# Patient Record
Sex: MALE | Race: NATIVE HAWAIIAN OR OTHER PACIFIC ISLANDER | Employment: OTHER | URBAN - METROPOLITAN AREA
[De-identification: names, ages, dates, MRNs, and addresses within clinical notes are randomized per-mention and may not be internally consistent; named-entity substitution may affect disease eponyms.]

---

## 2021-01-07 ENCOUNTER — OFFICE VISIT (OUTPATIENT)
Dept: FAMILY MEDICINE CLINIC | Facility: CLINIC | Age: 81
End: 2021-01-07
Payer: MEDICARE

## 2021-01-07 VITALS
HEART RATE: 83 BPM | BODY MASS INDEX: 25.33 KG/M2 | WEIGHT: 171 LBS | SYSTOLIC BLOOD PRESSURE: 120 MMHG | OXYGEN SATURATION: 98 % | HEIGHT: 69 IN | DIASTOLIC BLOOD PRESSURE: 68 MMHG

## 2021-01-07 DIAGNOSIS — J30.2 SEASONAL ALLERGIC RHINITIS, UNSPECIFIED TRIGGER: Primary | ICD-10-CM

## 2021-01-07 PROBLEM — E03.8 OTHER SPECIFIED HYPOTHYROIDISM: Status: ACTIVE | Noted: 2021-01-07

## 2021-01-07 PROBLEM — I10 ESSENTIAL HYPERTENSION: Status: ACTIVE | Noted: 2021-01-07

## 2021-01-07 PROBLEM — E78.49 OTHER HYPERLIPIDEMIA: Status: ACTIVE | Noted: 2021-01-07

## 2021-01-07 PROCEDURE — 99203 OFFICE O/P NEW LOW 30 MIN: CPT | Performed by: FAMILY MEDICINE

## 2021-01-07 RX ORDER — B-COMPLEX WITH VITAMIN C
1 TABLET ORAL DAILY
Qty: 90 TABLET | Refills: 1 | Status: SHIPPED | OUTPATIENT
Start: 2021-01-07

## 2021-01-07 RX ORDER — ATORVASTATIN CALCIUM 40 MG/1
40 TABLET, FILM COATED ORAL DAILY
Qty: 90 TABLET | Refills: 1 | Status: SHIPPED | OUTPATIENT
Start: 2021-01-07 | End: 2021-07-29

## 2021-01-07 RX ORDER — TAMSULOSIN HYDROCHLORIDE 0.4 MG/1
CAPSULE ORAL
COMMUNITY
Start: 2020-11-27 | End: 2021-01-07

## 2021-01-07 RX ORDER — LEVOTHYROXINE SODIUM 0.07 MG/1
75 TABLET ORAL
Qty: 90 TABLET | Refills: 1 | Status: SHIPPED | OUTPATIENT
Start: 2021-01-07 | End: 2021-07-29

## 2021-01-07 RX ORDER — LOSARTAN POTASSIUM 100 MG/1
TABLET ORAL
COMMUNITY
Start: 2020-10-15 | End: 2021-01-07

## 2021-01-07 RX ORDER — B-COMPLEX WITH VITAMIN C
TABLET ORAL
COMMUNITY
End: 2021-01-07

## 2021-01-07 RX ORDER — MELATONIN
1000 DAILY
Qty: 90 TABLET | Refills: 1 | Status: SHIPPED | OUTPATIENT
Start: 2021-01-07

## 2021-01-07 RX ORDER — ASPIRIN 81 MG/1
81 TABLET ORAL DAILY
COMMUNITY
End: 2021-01-07

## 2021-01-07 RX ORDER — FLUTICASONE PROPIONATE 50 MCG
2 SPRAY, SUSPENSION (ML) NASAL DAILY
Qty: 1 BOTTLE | Refills: 3 | Status: SHIPPED | OUTPATIENT
Start: 2021-01-07 | End: 2022-01-02

## 2021-01-07 RX ORDER — ATORVASTATIN CALCIUM 40 MG/1
TABLET, FILM COATED ORAL
COMMUNITY
Start: 2020-11-10 | End: 2021-01-07

## 2021-01-07 RX ORDER — ASPIRIN 81 MG/1
81 TABLET ORAL DAILY
Qty: 90 TABLET | Refills: 1 | Status: SHIPPED | OUTPATIENT
Start: 2021-01-07

## 2021-01-07 RX ORDER — RIBOFLAVIN (VITAMIN B2) 100 MG
100 TABLET ORAL DAILY
Qty: 90 TABLET | Refills: 1 | Status: SHIPPED | OUTPATIENT
Start: 2021-01-07

## 2021-01-07 RX ORDER — TAMSULOSIN HYDROCHLORIDE 0.4 MG/1
0.4 CAPSULE ORAL DAILY
Qty: 90 CAPSULE | Refills: 1 | Status: SHIPPED | OUTPATIENT
Start: 2021-01-07

## 2021-01-07 RX ORDER — LOSARTAN POTASSIUM 100 MG/1
100 TABLET ORAL DAILY
Qty: 90 TABLET | Refills: 1 | Status: SHIPPED | OUTPATIENT
Start: 2021-01-07 | End: 2021-07-29

## 2021-01-07 RX ORDER — MELATONIN
1000 DAILY
COMMUNITY
End: 2021-01-07

## 2021-01-07 RX ORDER — LEVOTHYROXINE SODIUM 0.07 MG/1
TABLET ORAL
COMMUNITY
Start: 2020-11-24 | End: 2021-01-07

## 2021-01-07 RX ORDER — RIBOFLAVIN (VITAMIN B2) 100 MG
100 TABLET ORAL DAILY
COMMUNITY
End: 2021-01-07

## 2021-01-07 NOTE — PROGRESS NOTES
HPI:    Patient ID: Tony Garcia is a 68year old male who presents for throat check. HPI  Would like his throat checked. Has runny nose in the morning. Stops on its own. Has some phlegm too.    Was given allergy medicine in the past, which seem ear discharge, ear pain, hearing loss, postnasal drip, sinus pressure, tinnitus, trouble swallowing and voice change. Eyes: Negative. Respiratory: Negative for cough, shortness of breath and wheezing. Cardiovascular: Negative.     Gastrointestinal: Prescriptions Disp Refills   • Fluticasone Propionate 50 MCG/ACT Nasal Suspension 1 Bottle 3     Si sprays by Each Nare route daily. • Ascorbic Acid (VITAMIN C) 100 MG Oral Tab 90 tablet 1     Sig: Take 1 tablet (100 mg total) by mouth daily.    • asp

## 2021-02-28 PROBLEM — G47.33 OSA (OBSTRUCTIVE SLEEP APNEA): Status: ACTIVE | Noted: 2021-02-28

## 2021-02-28 PROBLEM — I25.10 CORONARY ARTERY DISEASE INVOLVING NATIVE CORONARY ARTERY OF NATIVE HEART: Status: ACTIVE | Noted: 2021-02-28

## 2021-03-03 DIAGNOSIS — Z23 NEED FOR VACCINATION: ICD-10-CM

## 2021-07-19 ENCOUNTER — LAB ENCOUNTER (OUTPATIENT)
Dept: LAB | Facility: REFERENCE LAB | Age: 81
End: 2021-07-19
Attending: FAMILY MEDICINE
Payer: MEDICARE

## 2021-07-19 ENCOUNTER — OFFICE VISIT (OUTPATIENT)
Dept: FAMILY MEDICINE CLINIC | Facility: CLINIC | Age: 81
End: 2021-07-19
Payer: MEDICARE

## 2021-07-19 VITALS
OXYGEN SATURATION: 98 % | HEART RATE: 72 BPM | HEIGHT: 69 IN | BODY MASS INDEX: 25.62 KG/M2 | WEIGHT: 173 LBS | SYSTOLIC BLOOD PRESSURE: 110 MMHG | DIASTOLIC BLOOD PRESSURE: 60 MMHG

## 2021-07-19 DIAGNOSIS — H61.22 IMPACTED CERUMEN OF LEFT EAR: ICD-10-CM

## 2021-07-19 DIAGNOSIS — E78.49 OTHER HYPERLIPIDEMIA: ICD-10-CM

## 2021-07-19 DIAGNOSIS — I10 ESSENTIAL HYPERTENSION: ICD-10-CM

## 2021-07-19 DIAGNOSIS — R42 VERTIGO: ICD-10-CM

## 2021-07-19 DIAGNOSIS — Z00.00 ENCOUNTER FOR ANNUAL HEALTH EXAMINATION: Primary | ICD-10-CM

## 2021-07-19 DIAGNOSIS — N40.1 BENIGN PROSTATIC HYPERPLASIA WITH NOCTURIA: ICD-10-CM

## 2021-07-19 DIAGNOSIS — Z12.5 PROSTATE CANCER SCREENING: ICD-10-CM

## 2021-07-19 DIAGNOSIS — R35.1 BENIGN PROSTATIC HYPERPLASIA WITH NOCTURIA: ICD-10-CM

## 2021-07-19 DIAGNOSIS — E03.9 HYPOTHYROIDISM, UNSPECIFIED TYPE: ICD-10-CM

## 2021-07-19 DIAGNOSIS — I25.10 CORONARY ARTERY DISEASE INVOLVING NATIVE CORONARY ARTERY OF NATIVE HEART WITHOUT ANGINA PECTORIS: ICD-10-CM

## 2021-07-19 LAB
ALBUMIN SERPL-MCNC: 4.3 G/DL (ref 3.4–5)
ALBUMIN/GLOB SERPL: 1.2 {RATIO} (ref 1–2)
ALP LIVER SERPL-CCNC: 79 U/L
ALT SERPL-CCNC: 40 U/L
ANION GAP SERPL CALC-SCNC: 5 MMOL/L (ref 0–18)
AST SERPL-CCNC: 25 U/L (ref 15–37)
BILIRUB SERPL-MCNC: 1.5 MG/DL (ref 0.1–2)
BUN BLD-MCNC: 16 MG/DL (ref 7–18)
BUN/CREAT SERPL: 17.2 (ref 10–20)
CALCIUM BLD-MCNC: 9.6 MG/DL (ref 8.5–10.1)
CHLORIDE SERPL-SCNC: 106 MMOL/L (ref 98–112)
CHOLEST SMN-MCNC: 136 MG/DL (ref ?–200)
CO2 SERPL-SCNC: 28 MMOL/L (ref 21–32)
CREAT BLD-MCNC: 0.93 MG/DL
DEPRECATED RDW RBC AUTO: 43.6 FL (ref 35.1–46.3)
ERYTHROCYTE [DISTWIDTH] IN BLOOD BY AUTOMATED COUNT: 13.6 % (ref 11–15)
GLOBULIN PLAS-MCNC: 3.7 G/DL (ref 2.8–4.4)
GLUCOSE BLD-MCNC: 145 MG/DL (ref 70–99)
HCT VFR BLD AUTO: 44.8 %
HDLC SERPL-MCNC: 51 MG/DL (ref 40–59)
HGB BLD-MCNC: 14.7 G/DL
LDLC SERPL CALC-MCNC: 66 MG/DL (ref ?–100)
M PROTEIN MFR SERPL ELPH: 8 G/DL (ref 6.4–8.2)
MCH RBC QN AUTO: 28.6 PG (ref 26–34)
MCHC RBC AUTO-ENTMCNC: 32.8 G/DL (ref 31–37)
MCV RBC AUTO: 87.2 FL
NONHDLC SERPL-MCNC: 85 MG/DL (ref ?–130)
OSMOLALITY SERPL CALC.SUM OF ELEC: 292 MOSM/KG (ref 275–295)
PATIENT FASTING Y/N/NP: YES
PATIENT FASTING Y/N/NP: YES
PLATELET # BLD AUTO: 280 10(3)UL (ref 150–450)
POTASSIUM SERPL-SCNC: 4.2 MMOL/L (ref 3.5–5.1)
RBC # BLD AUTO: 5.14 X10(6)UL
SODIUM SERPL-SCNC: 139 MMOL/L (ref 136–145)
TRIGL SERPL-MCNC: 106 MG/DL (ref 30–149)
TSI SER-ACNC: 1.94 MIU/ML (ref 0.36–3.74)
VLDLC SERPL CALC-MCNC: 16 MG/DL (ref 0–30)
WBC # BLD AUTO: 7.4 X10(3) UL (ref 4–11)

## 2021-07-19 PROCEDURE — 69210 REMOVE IMPACTED EAR WAX UNI: CPT | Performed by: FAMILY MEDICINE

## 2021-07-19 PROCEDURE — 80061 LIPID PANEL: CPT | Performed by: FAMILY MEDICINE

## 2021-07-19 PROCEDURE — 85027 COMPLETE CBC AUTOMATED: CPT | Performed by: FAMILY MEDICINE

## 2021-07-19 PROCEDURE — 84443 ASSAY THYROID STIM HORMONE: CPT | Performed by: FAMILY MEDICINE

## 2021-07-19 PROCEDURE — 36415 COLL VENOUS BLD VENIPUNCTURE: CPT | Performed by: FAMILY MEDICINE

## 2021-07-19 PROCEDURE — 80053 COMPREHEN METABOLIC PANEL: CPT | Performed by: FAMILY MEDICINE

## 2021-07-19 PROCEDURE — G0439 PPPS, SUBSEQ VISIT: HCPCS | Performed by: FAMILY MEDICINE

## 2021-07-19 PROCEDURE — 99213 OFFICE O/P EST LOW 20 MIN: CPT | Performed by: FAMILY MEDICINE

## 2021-07-19 RX ORDER — MECLIZINE HCL 12.5 MG/1
12.5 TABLET ORAL 3 TIMES DAILY PRN
Qty: 30 TABLET | Refills: 0 | Status: SHIPPED | OUTPATIENT
Start: 2021-07-19

## 2021-07-19 NOTE — PROGRESS NOTES
HPI:   Genny Marrufo is a 80year old male who presents for a Medicare Subsequent Annual Wellness visit (Pt already had Initial Annual Wellness). Has had vertigo x10 days. Comes and goes. Started with wax build up in left ear.  Yesterday and today f Care on file in [facility] [address]. Not Discussed       He has never smoked tobacco.  Mr. Mayo Billings already takes aspirin and has it on his medication list.   CAGE screening score of 0 on 7/19/2021, showing low risk of alcohol abuse.          Patient Care Team: Patient C vision  HEENT: denies nasal congestion, sinus pain or ST  LUNGS: denies shortness of breath with exertion  CARDIOVASCULAR: denies chest pain on exertion  GI: denies abdominal pain, denies heartburn  : 2-3 per night nocturia, no complaint of urinary incon texture, turgor normal, no rashes or lesions   Lymph nodes: Cervical, supraclavicular, and axillary nodes normal   Neurologic: Normal              PROCEDURE NOTE:  Procedure: Cerumen removal  Risks and benefits were reviewed and verbal consent was obtained labs. To check today.   -Has urology f/u in 11/2021 with Dr. Linnea Egan record release form signed today. Need to obtain colonoscopy & vaccination (pneumococcal) records. -RTC in 6 months for check-up, or sooner prn.      The patient indicates un risk factors   • Men who are 73-68 years old and have ever smoked   • Anyone with a family history -     Colorectal Cancer Screening  Covered for ages 52-80; only need ONE of the following:    Colonoscopy   Covered every 10 years    Covered every 2 years i

## 2021-07-19 NOTE — PATIENT INSTRUCTIONS
Pro Nur's SCREENING SCHEDULE   Tests on this list are recommended by your physician but may not be covered, or covered at this frequency, by your insurer. Please check with your insurance carrier before scheduling to verify coverage.    PREVENT Pneumococcal Vaccination(1 of 1 - PPSV23) Never done    Hepatitis B One screening covered for patients with certain risk factors   -  No recommendations at this time    Tetanus Toxoid Not covered by Medicare Part B unless medically necessary (cut with

## 2021-07-29 RX ORDER — LOSARTAN POTASSIUM 100 MG/1
TABLET ORAL
Qty: 90 TABLET | Refills: 0 | Status: SHIPPED | OUTPATIENT
Start: 2021-07-29 | End: 2021-10-26

## 2021-07-29 RX ORDER — ATORVASTATIN CALCIUM 40 MG/1
TABLET, FILM COATED ORAL
Qty: 90 TABLET | Refills: 0 | Status: SHIPPED | OUTPATIENT
Start: 2021-07-29 | End: 2021-10-27

## 2021-07-29 RX ORDER — LEVOTHYROXINE SODIUM 0.07 MG/1
75 TABLET ORAL
Qty: 90 TABLET | Refills: 0 | Status: SHIPPED | OUTPATIENT
Start: 2021-07-29 | End: 2021-10-27

## 2021-08-19 ENCOUNTER — MED REC SCAN ONLY (OUTPATIENT)
Dept: FAMILY MEDICINE CLINIC | Facility: CLINIC | Age: 81
End: 2021-08-19

## 2021-10-26 RX ORDER — LOSARTAN POTASSIUM 100 MG/1
TABLET ORAL
Qty: 90 TABLET | Refills: 0 | Status: SHIPPED | OUTPATIENT
Start: 2021-10-26 | End: 2022-01-14

## 2021-10-27 RX ORDER — ATORVASTATIN CALCIUM 40 MG/1
40 TABLET, FILM COATED ORAL DAILY
Qty: 90 TABLET | Refills: 1 | Status: SHIPPED | OUTPATIENT
Start: 2021-10-27 | End: 2022-04-03

## 2021-10-27 RX ORDER — LEVOTHYROXINE SODIUM 0.07 MG/1
75 TABLET ORAL
Qty: 90 TABLET | Refills: 1 | Status: SHIPPED | OUTPATIENT
Start: 2021-10-27 | End: 2022-04-03

## 2021-11-22 ENCOUNTER — MED REC SCAN ONLY (OUTPATIENT)
Dept: FAMILY MEDICINE CLINIC | Facility: CLINIC | Age: 81
End: 2021-11-22

## 2021-12-23 ENCOUNTER — OFFICE VISIT (OUTPATIENT)
Dept: FAMILY MEDICINE CLINIC | Facility: CLINIC | Age: 81
End: 2021-12-23
Payer: MEDICARE

## 2021-12-23 VITALS
DIASTOLIC BLOOD PRESSURE: 88 MMHG | HEART RATE: 70 BPM | BODY MASS INDEX: 25 KG/M2 | SYSTOLIC BLOOD PRESSURE: 130 MMHG | WEIGHT: 172.63 LBS | OXYGEN SATURATION: 99 %

## 2021-12-23 DIAGNOSIS — I10 ESSENTIAL HYPERTENSION: Primary | ICD-10-CM

## 2021-12-23 DIAGNOSIS — R00.2 PALPITATIONS: ICD-10-CM

## 2021-12-23 DIAGNOSIS — I95.1 ORTHOSTATIC HYPOTENSION: ICD-10-CM

## 2021-12-23 PROCEDURE — 90662 IIV NO PRSV INCREASED AG IM: CPT | Performed by: FAMILY MEDICINE

## 2021-12-23 PROCEDURE — G0008 ADMIN INFLUENZA VIRUS VAC: HCPCS | Performed by: FAMILY MEDICINE

## 2021-12-23 PROCEDURE — 99214 OFFICE O/P EST MOD 30 MIN: CPT | Performed by: FAMILY MEDICINE

## 2021-12-23 NOTE — PROGRESS NOTES
HPI:    Patient ID: Israel Loza is a 80year old male who presents for check-up. HPI  Ended up seeing new cardiologist Dr. Ani Vlales with [facility]. Has been having palpitations since he was a child. No worsening.  Wore holter monitor x2 weeks but uns Bottle 3   • B Complex-C-E-Zn (BEC/ZINC) Oral Tab Take 1 tablet by mouth daily. (Patient not taking: Reported on 12/23/2021) 90 tablet 1   • tamsulosin (FLOMAX) cap Take 1 capsule (0.4 mg total) by mouth daily.  (Patient not taking: Reported on 12/23/2021) ASSESSMENT/PLAN:   Essential hypertension  (primary encounter diagnosis)  Palpitations  Orthostatic hypotension    1. Essential hypertension    2. Palpitations    3. Orthostatic hypotension     -Reviewed cardiology note from Dr. Cruz Flores at [facility].  Has f/u

## 2022-01-14 RX ORDER — LOSARTAN POTASSIUM 100 MG/1
TABLET ORAL
Qty: 90 TABLET | Refills: 0 | Status: SHIPPED | OUTPATIENT
Start: 2022-01-14

## 2022-04-03 RX ORDER — LEVOTHYROXINE SODIUM 0.07 MG/1
75 TABLET ORAL
Qty: 90 TABLET | Refills: 1 | Status: SHIPPED | OUTPATIENT
Start: 2022-04-03 | End: 2022-11-04

## 2022-04-03 RX ORDER — ATORVASTATIN CALCIUM 40 MG/1
40 TABLET, FILM COATED ORAL DAILY
Qty: 90 TABLET | Refills: 1 | Status: SHIPPED | OUTPATIENT
Start: 2022-04-03 | End: 2022-11-04

## 2022-04-03 RX ORDER — LOSARTAN POTASSIUM 100 MG/1
100 TABLET ORAL DAILY
Qty: 90 TABLET | Refills: 1 | Status: SHIPPED | OUTPATIENT
Start: 2022-04-03 | End: 2022-10-19

## 2022-04-03 NOTE — TELEPHONE ENCOUNTER
Refill passed per [place] Clerk, [facility] protocol.      Requested Prescriptions   Pending Prescriptions Disp Refills    LOSARTAN 100 MG Oral Tab [Pharmacy Med Name: Losartan Potassium 100 Mg Tab Auro] 90 tablet 0     Sig: TAKE ONE TABLET BY MOUTH ONE TIME DAILY        Hypertensive Medications Protocol Passed - 4/1/2022  5:39 PM        Passed - CMP or BMP in past 12 months        Passed - Appointment in past 6 or next 3 months        Passed - GFR Non- > 50     Lab Results   Component Value Date    GFRNAA 78 07/19/2021                    ATORVASTATIN 40 MG Oral Tab [Pharmacy Med Name: Atorvastatin Calcium 40 Mg Tab [place]] 90 tablet 0     Sig: TAKE ONE TABLET BY MOUTH ONE TIME DAILY        Cholesterol Medication Protocol Passed - 4/1/2022  5:39 PM        Passed - ALT in past 12 months        Passed - LDL in past 12 months        Passed - Last ALT < 80       Lab Results   Component Value Date    ALT 40 07/19/2021             Passed - Last LDL < 130     Lab Results   Component Value Date    LDL 66 07/19/2021               Passed - Appointment in past 12 or next 3 months           LEVOTHYROXINE 75 MCG Oral Tab [Pharmacy Med Name: Levothyroxine Sodium 75 Mcg Tab Lupi] 90 tablet 0     Sig: Take 1 tablet (75 mcg total) by mouth before breakfast.        Thyroid Medication Protocol Passed - 4/1/2022  5:39 PM        Passed - TSH in past 12 months        Passed - Last TSH value is normal     Lab Results   Component Value Date    TSH 1.940 07/19/2021                 Passed - Appointment in past 12 or next 3 months                Recent Outpatient Visits              3 months ago Essential hypertension    [address] [facility]     Office Visit    8 months ago Encounter for annual health examination    [address] DO    Office Visit    1 year ago Seasonal allergic rhinitis, unspecified trigger    [facility] [place], [place]    Office Visit

## 2022-05-23 ENCOUNTER — MED REC SCAN ONLY (OUTPATIENT)
Dept: FAMILY MEDICINE CLINIC | Facility: CLINIC | Age: 82
End: 2022-05-23

## 2022-06-15 ENCOUNTER — NURSE TRIAGE (OUTPATIENT)
Dept: FAMILY MEDICINE CLINIC | Facility: CLINIC | Age: 82
End: 2022-06-15

## 2022-06-16 ENCOUNTER — OFFICE VISIT (OUTPATIENT)
Dept: FAMILY MEDICINE CLINIC | Facility: CLINIC | Age: 82
End: 2022-06-16
Payer: MEDICARE

## 2022-06-16 VITALS
HEART RATE: 66 BPM | WEIGHT: 173 LBS | OXYGEN SATURATION: 98 % | BODY MASS INDEX: 25.62 KG/M2 | DIASTOLIC BLOOD PRESSURE: 78 MMHG | SYSTOLIC BLOOD PRESSURE: 120 MMHG | HEIGHT: 69 IN

## 2022-06-16 DIAGNOSIS — K30 INDIGESTION: Primary | ICD-10-CM

## 2022-06-16 PROCEDURE — 99213 OFFICE O/P EST LOW 20 MIN: CPT | Performed by: FAMILY MEDICINE

## 2022-07-06 ENCOUNTER — LAB ENCOUNTER (OUTPATIENT)
Dept: LAB | Facility: REFERENCE LAB | Age: 82
End: 2022-07-06
Attending: FAMILY MEDICINE
Payer: MEDICARE

## 2022-07-06 ENCOUNTER — OFFICE VISIT (OUTPATIENT)
Dept: FAMILY MEDICINE CLINIC | Facility: CLINIC | Age: 82
End: 2022-07-06
Payer: MEDICARE

## 2022-07-06 VITALS
WEIGHT: 170 LBS | HEIGHT: 69 IN | DIASTOLIC BLOOD PRESSURE: 82 MMHG | OXYGEN SATURATION: 98 % | HEART RATE: 56 BPM | BODY MASS INDEX: 25.18 KG/M2 | SYSTOLIC BLOOD PRESSURE: 136 MMHG

## 2022-07-06 DIAGNOSIS — I47.1 PSVT (PAROXYSMAL SUPRAVENTRICULAR TACHYCARDIA) (HCC): ICD-10-CM

## 2022-07-06 DIAGNOSIS — Z00.00 ENCOUNTER FOR ANNUAL HEALTH EXAMINATION: Primary | ICD-10-CM

## 2022-07-06 DIAGNOSIS — R73.01 ELEVATED FASTING GLUCOSE: ICD-10-CM

## 2022-07-06 DIAGNOSIS — R14.0 ABDOMINAL BLOATING: ICD-10-CM

## 2022-07-06 DIAGNOSIS — I10 ESSENTIAL HYPERTENSION: ICD-10-CM

## 2022-07-06 DIAGNOSIS — E03.8 OTHER SPECIFIED HYPOTHYROIDISM: ICD-10-CM

## 2022-07-06 DIAGNOSIS — E55.9 VITAMIN D DEFICIENCY: ICD-10-CM

## 2022-07-06 DIAGNOSIS — E78.49 OTHER HYPERLIPIDEMIA: ICD-10-CM

## 2022-07-06 DIAGNOSIS — N40.1 BENIGN PROSTATIC HYPERPLASIA WITH NOCTURIA: ICD-10-CM

## 2022-07-06 DIAGNOSIS — R35.1 BENIGN PROSTATIC HYPERPLASIA WITH NOCTURIA: ICD-10-CM

## 2022-07-06 PROBLEM — I47.10 PSVT (PAROXYSMAL SUPRAVENTRICULAR TACHYCARDIA): Status: ACTIVE | Noted: 2022-07-06

## 2022-07-06 PROBLEM — I47.10 PSVT (PAROXYSMAL SUPRAVENTRICULAR TACHYCARDIA) (HCC): Status: ACTIVE | Noted: 2022-07-06

## 2022-07-06 LAB
ALBUMIN SERPL-MCNC: 4.2 G/DL (ref 3.4–5)
ALBUMIN/GLOB SERPL: 1.2 {RATIO} (ref 1–2)
ALP LIVER SERPL-CCNC: 80 U/L
ALT SERPL-CCNC: 41 U/L
ANION GAP SERPL CALC-SCNC: 8 MMOL/L (ref 0–18)
AST SERPL-CCNC: 28 U/L (ref 15–37)
BASOPHILS # BLD AUTO: 0.05 X10(3) UL (ref 0–0.2)
BASOPHILS NFR BLD AUTO: 0.6 %
BILIRUB SERPL-MCNC: 1.8 MG/DL (ref 0.1–2)
BUN BLD-MCNC: 10 MG/DL (ref 7–18)
BUN/CREAT SERPL: 9.7 (ref 10–20)
CALCIUM BLD-MCNC: 9.5 MG/DL (ref 8.5–10.1)
CHLORIDE SERPL-SCNC: 109 MMOL/L (ref 98–112)
CHOLEST SERPL-MCNC: 115 MG/DL (ref ?–200)
CO2 SERPL-SCNC: 26 MMOL/L (ref 21–32)
CREAT BLD-MCNC: 1.03 MG/DL
DEPRECATED RDW RBC AUTO: 44.6 FL (ref 35.1–46.3)
EOSINOPHIL # BLD AUTO: 0.05 X10(3) UL (ref 0–0.7)
EOSINOPHIL NFR BLD AUTO: 0.6 %
ERYTHROCYTE [DISTWIDTH] IN BLOOD BY AUTOMATED COUNT: 13.5 % (ref 11–15)
EST. AVERAGE GLUCOSE BLD GHB EST-MCNC: 151 MG/DL (ref 68–126)
FASTING PATIENT LIPID ANSWER: NO
FASTING STATUS PATIENT QL REPORTED: NO
GLOBULIN PLAS-MCNC: 3.4 G/DL (ref 2.8–4.4)
GLUCOSE BLD-MCNC: 123 MG/DL (ref 70–99)
HBA1C MFR BLD: 6.9 % (ref ?–5.7)
HCT VFR BLD AUTO: 44.2 %
HDLC SERPL-MCNC: 47 MG/DL (ref 40–59)
HGB BLD-MCNC: 14.2 G/DL
IMM GRANULOCYTES # BLD AUTO: 0.02 X10(3) UL (ref 0–1)
IMM GRANULOCYTES NFR BLD: 0.3 %
LDLC SERPL CALC-MCNC: 47 MG/DL (ref ?–100)
LYMPHOCYTES # BLD AUTO: 2.35 X10(3) UL (ref 1–4)
LYMPHOCYTES NFR BLD AUTO: 30.1 %
MCH RBC QN AUTO: 28.8 PG (ref 26–34)
MCHC RBC AUTO-ENTMCNC: 32.1 G/DL (ref 31–37)
MCV RBC AUTO: 89.7 FL
MONOCYTES # BLD AUTO: 0.6 X10(3) UL (ref 0.1–1)
MONOCYTES NFR BLD AUTO: 7.7 %
NEUTROPHILS # BLD AUTO: 4.74 X10 (3) UL (ref 1.5–7.7)
NEUTROPHILS # BLD AUTO: 4.74 X10(3) UL (ref 1.5–7.7)
NEUTROPHILS NFR BLD AUTO: 60.7 %
NONHDLC SERPL-MCNC: 68 MG/DL (ref ?–130)
OSMOLALITY SERPL CALC.SUM OF ELEC: 296 MOSM/KG (ref 275–295)
PLATELET # BLD AUTO: 307 10(3)UL (ref 150–450)
POTASSIUM SERPL-SCNC: 4 MMOL/L (ref 3.5–5.1)
PROT SERPL-MCNC: 7.6 G/DL (ref 6.4–8.2)
RBC # BLD AUTO: 4.93 X10(6)UL
SODIUM SERPL-SCNC: 143 MMOL/L (ref 136–145)
TRIGL SERPL-MCNC: 118 MG/DL (ref 30–149)
TSI SER-ACNC: 1.28 MIU/ML (ref 0.36–3.74)
VLDLC SERPL CALC-MCNC: 17 MG/DL (ref 0–30)
WBC # BLD AUTO: 7.8 X10(3) UL (ref 4–11)

## 2022-07-06 PROCEDURE — 99214 OFFICE O/P EST MOD 30 MIN: CPT | Performed by: FAMILY MEDICINE

## 2022-07-06 PROCEDURE — 1126F AMNT PAIN NOTED NONE PRSNT: CPT | Performed by: FAMILY MEDICINE

## 2022-07-06 PROCEDURE — 80053 COMPREHEN METABOLIC PANEL: CPT | Performed by: FAMILY MEDICINE

## 2022-07-06 PROCEDURE — G0439 PPPS, SUBSEQ VISIT: HCPCS | Performed by: FAMILY MEDICINE

## 2022-07-06 PROCEDURE — 84443 ASSAY THYROID STIM HORMONE: CPT | Performed by: FAMILY MEDICINE

## 2022-07-06 PROCEDURE — 85025 COMPLETE CBC W/AUTO DIFF WBC: CPT | Performed by: FAMILY MEDICINE

## 2022-07-06 PROCEDURE — 83036 HEMOGLOBIN GLYCOSYLATED A1C: CPT | Performed by: FAMILY MEDICINE

## 2022-07-06 PROCEDURE — 80061 LIPID PANEL: CPT | Performed by: FAMILY MEDICINE

## 2022-07-06 PROCEDURE — 83013 H PYLORI (C-13) BREATH: CPT

## 2022-07-06 PROCEDURE — 36415 COLL VENOUS BLD VENIPUNCTURE: CPT | Performed by: FAMILY MEDICINE

## 2022-07-08 LAB — H. PYLORI BREATH TEST: NEGATIVE

## 2022-07-11 DIAGNOSIS — R14.0 ABDOMINAL BLOATING: Primary | ICD-10-CM

## 2022-07-25 ENCOUNTER — HOSPITAL ENCOUNTER (OUTPATIENT)
Dept: ULTRASOUND IMAGING | Age: 82
Discharge: HOME | End: 2022-07-25
Attending: FAMILY MEDICINE
Payer: MEDICARE

## 2022-07-25 DIAGNOSIS — R14.0 ABDOMINAL BLOATING: ICD-10-CM

## 2022-07-25 PROCEDURE — 76700 US EXAM ABDOM COMPLETE: CPT | Performed by: FAMILY MEDICINE

## 2022-09-19 ENCOUNTER — HOSPITAL ENCOUNTER (OUTPATIENT)
Age: 82
Discharge: HOME | End: 2022-09-19

## 2022-09-19 ENCOUNTER — NURSE TRIAGE (OUTPATIENT)
Dept: FAMILY MEDICINE CLINIC | Facility: CLINIC | Age: 82
End: 2022-09-19

## 2022-09-19 VITALS
HEART RATE: 60 BPM | SYSTOLIC BLOOD PRESSURE: 128 MMHG | OXYGEN SATURATION: 99 % | DIASTOLIC BLOOD PRESSURE: 72 MMHG | TEMPERATURE: 98 F | RESPIRATION RATE: 18 BRPM

## 2022-09-19 DIAGNOSIS — R30.0 DYSURIA: Primary | ICD-10-CM

## 2022-09-19 LAB
BILIRUB UR QL STRIP: NEGATIVE
CLARITY UR: CLEAR
COLOR UR: YELLOW
GLUCOSE UR STRIP-MCNC: NEGATIVE MG/DL
HGB UR QL STRIP: NEGATIVE
KETONES UR STRIP-MCNC: NEGATIVE MG/DL
LEUKOCYTE ESTERASE UR QL STRIP: NEGATIVE
NITRITE UR QL STRIP: NEGATIVE
PH UR STRIP: 5.5 [PH]
PROT UR STRIP-MCNC: NEGATIVE MG/DL
SP GR UR STRIP: <=1.005
UROBILINOGEN UR STRIP-ACNC: <2 MG/DL

## 2022-09-19 PROCEDURE — 81002 URINALYSIS NONAUTO W/O SCOPE: CPT | Performed by: NURSE PRACTITIONER

## 2022-09-19 PROCEDURE — 99202 OFFICE O/P NEW SF 15 MIN: CPT | Performed by: NURSE PRACTITIONER

## 2022-09-20 ENCOUNTER — OFFICE VISIT (OUTPATIENT)
Dept: FAMILY MEDICINE CLINIC | Facility: CLINIC | Age: 82
End: 2022-09-20

## 2022-09-20 VITALS
HEIGHT: 69 IN | BODY MASS INDEX: 24.59 KG/M2 | SYSTOLIC BLOOD PRESSURE: 132 MMHG | DIASTOLIC BLOOD PRESSURE: 68 MMHG | HEART RATE: 78 BPM | OXYGEN SATURATION: 98 % | WEIGHT: 166 LBS

## 2022-09-20 DIAGNOSIS — R35.0 URINARY FREQUENCY: ICD-10-CM

## 2022-09-20 DIAGNOSIS — R35.1 BENIGN PROSTATIC HYPERPLASIA WITH NOCTURIA: ICD-10-CM

## 2022-09-20 DIAGNOSIS — N40.1 BENIGN PROSTATIC HYPERPLASIA WITH NOCTURIA: ICD-10-CM

## 2022-09-20 DIAGNOSIS — E11.9 TYPE 2 DIABETES MELLITUS WITHOUT COMPLICATION, WITHOUT LONG-TERM CURRENT USE OF INSULIN (HCC): ICD-10-CM

## 2022-09-20 DIAGNOSIS — R30.0 DYSURIA: Primary | ICD-10-CM

## 2022-09-20 LAB
APPEARANCE: CLEAR
BILIRUBIN: NEGATIVE
CARTRIDGE LOT#: 970 NUMERIC
GLUCOSE (URINE DIPSTICK): 100 MG/DL
HEMOGLOBIN A1C: 6.7 % (ref 4.3–5.6)
KETONES (URINE DIPSTICK): NEGATIVE MG/DL
LEUKOCYTES: NEGATIVE
MULTISTIX LOT#: ABNORMAL NUMERIC
NITRITE, URINE: NEGATIVE
OCCULT BLOOD: NEGATIVE
PH, URINE: 5.5 (ref 4.5–8)
PROTEIN (URINE DIPSTICK): NEGATIVE MG/DL
SPECIFIC GRAVITY: 1.01 (ref 1–1.03)
URINE-COLOR: YELLOW
UROBILINOGEN,SEMI-QN: 0.2 MG/DL (ref 0–1.9)

## 2022-09-20 PROCEDURE — 99214 OFFICE O/P EST MOD 30 MIN: CPT | Performed by: FAMILY MEDICINE

## 2022-09-20 PROCEDURE — 81002 URINALYSIS NONAUTO W/O SCOPE: CPT | Performed by: FAMILY MEDICINE

## 2022-09-20 PROCEDURE — 83036 HEMOGLOBIN GLYCOSYLATED A1C: CPT | Performed by: FAMILY MEDICINE

## 2022-09-20 RX ORDER — SULFAMETHOXAZOLE AND TRIMETHOPRIM 800; 160 MG/1; MG/1
1 TABLET ORAL 2 TIMES DAILY
Qty: 14 TABLET | Refills: 0 | Status: SHIPPED | OUTPATIENT
Start: 2022-09-20 | End: 2022-09-27

## 2022-09-22 ENCOUNTER — TELEPHONE (OUTPATIENT)
Dept: FAMILY MEDICINE CLINIC | Facility: CLINIC | Age: 82
End: 2022-09-22

## 2022-09-22 NOTE — TELEPHONE ENCOUNTER
Ophelia Gore from [facility] is calling at urine culture for pt and would like labs sent over. Pt is coming in for appt at 8 today at [facility].  Would like someone to call before than

## 2022-09-29 ENCOUNTER — MED REC SCAN ONLY (OUTPATIENT)
Dept: FAMILY MEDICINE CLINIC | Facility: CLINIC | Age: 82
End: 2022-09-29

## 2022-10-19 RX ORDER — LOSARTAN POTASSIUM 100 MG/1
100 TABLET ORAL DAILY
Qty: 90 TABLET | Refills: 1 | Status: SHIPPED | OUTPATIENT
Start: 2022-10-19

## 2022-10-19 NOTE — TELEPHONE ENCOUNTER
Refill passed per [place] protocol.   Requested Prescriptions   Pending Prescriptions Disp Refills    LOSARTAN 100 MG Oral Tab [Pharmacy Med Name: Losartan Potassium 100 Mg Tab [facility]] 90 tablet 0     Sig: TAKE ONE TABLET BY MOUTH ONE TIME DAILY        Hypertensive Medications Protocol Passed - 10/19/2022  1:18 PM        Passed - In person appointment in the past 12 or next 3 months       Recent Outpatient Visits              4 weeks ago [address] [facility] 183 Pamela Cadena, DO    Office Visit    3 months ago Encounter for annual health examination    [address] [place] [address] 183 Sherritika Penaustint, DO    Office Visit    4 months ago [address] Sherra Penaustint, DO    Office Visit    10 months ago Essential hypertension    [address] [place] [address] 183 Pamela [place], DO    Office Visit    1 year ago Encounter for annual health examination    [address] [place] [address] 183 Sherritika Cadena, DO    Office Visit                 Passed - Last BP reading less than 140/90     BP Readings from Last 1 Encounters:  09/20/22 : 132/68                Passed - CMP or BMP in past 6 months     Recent Results (from the past 4392 hour(s))   COMP METABOLIC PANEL (14)    Collection Time: 07/06/22  2:30 PM   Result Value Ref Range    Glucose 123 (H) 70 - 99 mg/dL    Sodium 143 136 - 145 mmol/L    Potassium 4.0 3.5 - 5.1 mmol/L    Chloride 109 98 - 112 mmol/L    CO2 26.0 21.0 - 32.0 mmol/L    Anion Gap 8 0 - 18 mmol/L    BUN 10 7 - 18 mg/dL    Creatinine 1.03 0.70 - 1.30 mg/dL    BUN/CREA Ratio 9.7 (L) 10.0 - 20.0    Calcium, Total 9.5 8.5 - 10.1 mg/dL    Calculated Osmolality 296 (H) 275 - 295 mOsm/kg    GFR, Non- 69 >=60    GFR, -American 79 >=60    ALT 41 16 - 61 U/L    AST 28 15 - 37 U/L    Alkaline Phosphatase 80 45 - 117 U/L Bilirubin, Total 1.8 0.1 - 2.0 mg/dL    Total Protein 7.6 6.4 - 8.2 g/dL    Albumin 4.2 3.4 - 5.0 g/dL    Globulin  3.4 2.8 - 4.4 g/dL    A/G Ratio 1.2 1.0 - 2.0    Patient Fasting for CMP? No      *Note: Due to a large number of results and/or encounters for the requested time period, some results have not been displayed. A complete set of results can be found in Results Review.                  Passed - In person appointment or virtual visit in the past 6 months       Recent Outpatient Visits              4 weeks ago [address] [place] [address] Ariela Aguilar, DO    Office Visit    3 months ago Encounter for annual health examination    [address] [place] [address] Ariela Aguilar, DO    Office Visit    4 months ago [address] Ariela Aguilar, DO    Office Visit    10 months ago Essential hypertension    [address] [place] [address] Ariela Aguilar, DO    Office Visit    1 year ago Encounter for annual health examination    [place] [address], DO    Office Visit                 Passed - EGFRCR or GFRNAA > 50     GFR Evaluation  GFRNAA: 69 , resulted on 7/6/2022                Recent Outpatient Visits              4 weeks ago [address] [place] [address] Ariela Aguilar, DO    Office Visit    3 months ago Encounter for annual health examination    [address] [place] [address] Ariela Aguilar, DO    Office Visit    4 months ago [address] Ariela Aguilar, DO    Office Visit    10 months ago Essential hypertension    [address] [place] [address] 183 Ariela Aguilar, DO    Office Visit    1 year ago Encounter for annual health examination    [facility] [address] [place], [place]    Office Visit

## 2022-10-27 ENCOUNTER — MED REC SCAN ONLY (OUTPATIENT)
Dept: FAMILY MEDICINE CLINIC | Facility: CLINIC | Age: 82
End: 2022-10-27

## 2023-02-21 ENCOUNTER — TELEPHONE (OUTPATIENT)
Facility: CLINIC | Age: 83
End: 2023-02-21

## 2023-02-21 NOTE — TELEPHONE ENCOUNTER
Called to schedule medicare annual wellness visit. No answer/ phone kept ringing and busy signal at the end.

## 2023-04-13 RX ORDER — LOSARTAN POTASSIUM 100 MG/1
100 TABLET ORAL DAILY
Qty: 90 TABLET | Refills: 1 | Status: SHIPPED | OUTPATIENT
Start: 2023-04-13

## 2023-04-13 NOTE — TELEPHONE ENCOUNTER
Protocol failed or has No Protocol, please review  Requested Prescriptions   Pending Prescriptions Disp Refills    LOSARTAN 100 MG Oral Tab [Pharmacy Med Name: Losartan Potassium 100 Mg Tab [facility]] 90 tablet 0     Sig: TAKE ONE TABLET BY MOUTH ONE TIME DAILY       Hypertensive Medications Protocol Failed - 4/13/2023  1:30 AM        Failed - CMP or BMP in past 6 months     No results found for this or any previous visit (from the past 4392 hour(s)).               Failed - In person appointment or virtual visit in the past 6 months     Recent Outpatient Visits              6 months ago [address] [facility] 183 Andres Bear [place]    Office Visit    9 months ago Encounter for annual health examination    [address] [place], [place]    Office Visit    10 months ago [address] [facility]     Office Visit    1 year ago Essential hypertension    [address] [facility] 183 Andres Bear DO    Office Visit    1 year ago Encounter for annual health examination    [address] [place], [place]    Office Visit          Future Appointments         Provider Department Appt Notes    In 3 months Andres Bear DO [address] [facility] 183 Eldon Pradhan annual wellness visit               Passed - In person appointment in the past 12 or next 3 months     Recent Outpatient Visits              6 months ago [address] [facility] 183 Andres Bear DO    Office Visit    9 months ago Encounter for annual health examination    [address] [facility] [address] Andres Bear [place]    Office Visit    10 months ago 250 [facility] 183 Andres Bear DO    Office Visit    1 year ago Essential hypertension    [facility], [facility], [facility]    Office Visit    1 year ago Encounter for annual health examination    [address] [facility]    Office Visit          Future Appointments         Provider Department Appt Notes    In 3 months DO [address] [place] annual wellness visit               Passed - Last BP reading less than 140/90     BP Readings from Last 1 Encounters:  09/20/22 : 132/68                Passed - EGFRCR or GFRNAA > 50     GFR Evaluation  GFRNAA: 69 , resulted on 7/6/2022               Future Appointments         Provider Department Appt Notes    In 3 months Handy Ramírez DO [address], [facility], [facility] annual wellness visit          Recent Outpatient Visits              6 months ago [address] [facility], [facility] 183 [facility]    Office Visit    9 months ago Encounter for annual health examination    [address] [facility]    Office Visit    10 months ago [address] [facility], [place],     Office Visit    1 year ago Essential hypertension    [address] Handy Ramírez DO    Office Visit    1 year ago Encounter for annual health examination    [address] [facility]    Office Visit

## 2023-04-18 ENCOUNTER — LAB ENCOUNTER (OUTPATIENT)
Dept: LAB | Facility: REFERENCE LAB | Age: 83
End: 2023-04-18
Attending: FAMILY MEDICINE
Payer: MEDICARE

## 2023-04-18 ENCOUNTER — EKG ENCOUNTER (OUTPATIENT)
Dept: LAB | Age: 83
End: 2023-04-18
Attending: FAMILY MEDICINE
Payer: MEDICARE

## 2023-04-18 ENCOUNTER — OFFICE VISIT (OUTPATIENT)
Facility: CLINIC | Age: 83
End: 2023-04-18
Payer: COMMERCIAL

## 2023-04-18 ENCOUNTER — NURSE TRIAGE (OUTPATIENT)
Facility: CLINIC | Age: 83
End: 2023-04-18

## 2023-04-18 VITALS
HEART RATE: 73 BPM | OXYGEN SATURATION: 98 % | SYSTOLIC BLOOD PRESSURE: 118 MMHG | DIASTOLIC BLOOD PRESSURE: 74 MMHG | WEIGHT: 174 LBS | HEIGHT: 69 IN | BODY MASS INDEX: 25.77 KG/M2

## 2023-04-18 DIAGNOSIS — E03.8 OTHER SPECIFIED HYPOTHYROIDISM: ICD-10-CM

## 2023-04-18 DIAGNOSIS — R42 DIZZINESS: Primary | ICD-10-CM

## 2023-04-18 DIAGNOSIS — I10 ESSENTIAL HYPERTENSION: ICD-10-CM

## 2023-04-18 DIAGNOSIS — E11.9 TYPE 2 DIABETES MELLITUS WITHOUT COMPLICATION, WITHOUT LONG-TERM CURRENT USE OF INSULIN (HCC): ICD-10-CM

## 2023-04-18 DIAGNOSIS — E55.9 VITAMIN D DEFICIENCY: ICD-10-CM

## 2023-04-18 DIAGNOSIS — R42 DIZZINESS: ICD-10-CM

## 2023-04-18 LAB
ALBUMIN SERPL-MCNC: 3.9 G/DL (ref 3.4–5)
ALBUMIN/GLOB SERPL: 1.1 {RATIO} (ref 1–2)
ALP LIVER SERPL-CCNC: 84 U/L
ALT SERPL-CCNC: 38 U/L
ANION GAP SERPL CALC-SCNC: 5 MMOL/L (ref 0–18)
AST SERPL-CCNC: 24 U/L (ref 15–37)
BASOPHILS # BLD AUTO: 0.04 X10(3) UL (ref 0–0.2)
BASOPHILS NFR BLD AUTO: 0.6 %
BILIRUB SERPL-MCNC: 1.2 MG/DL (ref 0.1–2)
BUN BLD-MCNC: 14 MG/DL (ref 7–18)
BUN/CREAT SERPL: 13.7 (ref 10–20)
CALCIUM BLD-MCNC: 9.2 MG/DL (ref 8.5–10.1)
CHLORIDE SERPL-SCNC: 108 MMOL/L (ref 98–112)
CO2 SERPL-SCNC: 26 MMOL/L (ref 21–32)
CREAT BLD-MCNC: 1.02 MG/DL
DEPRECATED RDW RBC AUTO: 42.3 FL (ref 35.1–46.3)
EOSINOPHIL # BLD AUTO: 0.06 X10(3) UL (ref 0–0.7)
EOSINOPHIL NFR BLD AUTO: 0.8 %
ERYTHROCYTE [DISTWIDTH] IN BLOOD BY AUTOMATED COUNT: 13.4 % (ref 11–15)
EST. AVERAGE GLUCOSE BLD GHB EST-MCNC: 166 MG/DL (ref 68–126)
FASTING STATUS PATIENT QL REPORTED: NO
GFR SERPLBLD BASED ON 1.73 SQ M-ARVRAT: 74 ML/MIN/1.73M2 (ref 60–?)
GLOBULIN PLAS-MCNC: 3.4 G/DL (ref 2.8–4.4)
GLUCOSE BLD-MCNC: 223 MG/DL (ref 70–99)
HBA1C MFR BLD: 7.4 % (ref ?–5.7)
HCT VFR BLD AUTO: 42.7 %
HGB BLD-MCNC: 14.1 G/DL
IMM GRANULOCYTES # BLD AUTO: 0.02 X10(3) UL (ref 0–1)
IMM GRANULOCYTES NFR BLD: 0.3 %
LYMPHOCYTES # BLD AUTO: 2.47 X10(3) UL (ref 1–4)
LYMPHOCYTES NFR BLD AUTO: 34 %
MCH RBC QN AUTO: 28.6 PG (ref 26–34)
MCHC RBC AUTO-ENTMCNC: 33 G/DL (ref 31–37)
MCV RBC AUTO: 86.6 FL
MONOCYTES # BLD AUTO: 0.5 X10(3) UL (ref 0.1–1)
MONOCYTES NFR BLD AUTO: 6.9 %
NEUTROPHILS # BLD AUTO: 4.17 X10 (3) UL (ref 1.5–7.7)
NEUTROPHILS # BLD AUTO: 4.17 X10(3) UL (ref 1.5–7.7)
NEUTROPHILS NFR BLD AUTO: 57.4 %
OSMOLALITY SERPL CALC.SUM OF ELEC: 295 MOSM/KG (ref 275–295)
PLATELET # BLD AUTO: 296 10(3)UL (ref 150–450)
POTASSIUM SERPL-SCNC: 4 MMOL/L (ref 3.5–5.1)
PROT SERPL-MCNC: 7.3 G/DL (ref 6.4–8.2)
RBC # BLD AUTO: 4.93 X10(6)UL
SODIUM SERPL-SCNC: 139 MMOL/L (ref 136–145)
TSI SER-ACNC: 2.04 MIU/ML (ref 0.36–3.74)
VIT D+METAB SERPL-MCNC: 19 NG/ML (ref 30–100)
WBC # BLD AUTO: 7.3 X10(3) UL (ref 4–11)

## 2023-04-18 PROCEDURE — 3008F BODY MASS INDEX DOCD: CPT | Performed by: FAMILY MEDICINE

## 2023-04-18 PROCEDURE — 85025 COMPLETE CBC W/AUTO DIFF WBC: CPT

## 2023-04-18 PROCEDURE — 3074F SYST BP LT 130 MM HG: CPT | Performed by: FAMILY MEDICINE

## 2023-04-18 PROCEDURE — 80053 COMPREHEN METABOLIC PANEL: CPT

## 2023-04-18 PROCEDURE — 99214 OFFICE O/P EST MOD 30 MIN: CPT | Performed by: FAMILY MEDICINE

## 2023-04-18 PROCEDURE — 93005 ELECTROCARDIOGRAM TRACING: CPT

## 2023-04-18 PROCEDURE — 83036 HEMOGLOBIN GLYCOSYLATED A1C: CPT

## 2023-04-18 PROCEDURE — 84443 ASSAY THYROID STIM HORMONE: CPT

## 2023-04-18 PROCEDURE — 36415 COLL VENOUS BLD VENIPUNCTURE: CPT

## 2023-04-18 PROCEDURE — 3078F DIAST BP <80 MM HG: CPT | Performed by: FAMILY MEDICINE

## 2023-04-18 PROCEDURE — 93010 ELECTROCARDIOGRAM REPORT: CPT | Performed by: INTERNAL MEDICINE

## 2023-04-18 PROCEDURE — 82306 VITAMIN D 25 HYDROXY: CPT

## 2023-04-19 LAB
ATRIAL RATE: 77 BPM
P AXIS: 32 DEGREES
P-R INTERVAL: 136 MS
Q-T INTERVAL: 398 MS
QRS DURATION: 82 MS
QTC CALCULATION (BEZET): 450 MS
R AXIS: -22 DEGREES
T AXIS: 28 DEGREES
VENTRICULAR RATE: 77 BPM

## 2023-05-10 ENCOUNTER — NURSE TRIAGE (OUTPATIENT)
Facility: CLINIC | Age: 83
End: 2023-05-10

## 2023-05-10 NOTE — TELEPHONE ENCOUNTER
Spoke to patient's spouse Nora Hernandez (on [place]), verified Name and . Relayed Dr. Payton James message below. Appointment scheduled. Spouse verbalized understanding and had no further questions at this time.     Future Appointments   Date Time Provider Radha Handy   2023 10:00 AM Shae Ruiz DO [facility] FP [facility] OP

## 2023-05-11 ENCOUNTER — OFFICE VISIT (OUTPATIENT)
Facility: CLINIC | Age: 83
End: 2023-05-11
Payer: MEDICARE

## 2023-05-11 VITALS
SYSTOLIC BLOOD PRESSURE: 138 MMHG | OXYGEN SATURATION: 98 % | BODY MASS INDEX: 26.22 KG/M2 | HEIGHT: 69 IN | WEIGHT: 177 LBS | DIASTOLIC BLOOD PRESSURE: 70 MMHG | HEART RATE: 64 BPM

## 2023-05-11 DIAGNOSIS — G89.29 CHRONIC LEFT-SIDED LOW BACK PAIN WITH LEFT-SIDED SCIATICA: Primary | ICD-10-CM

## 2023-05-11 DIAGNOSIS — M54.42 CHRONIC LEFT-SIDED LOW BACK PAIN WITH LEFT-SIDED SCIATICA: Primary | ICD-10-CM

## 2023-05-11 PROCEDURE — 99214 OFFICE O/P EST MOD 30 MIN: CPT | Performed by: FAMILY MEDICINE

## 2023-05-11 RX ORDER — METHYLPREDNISOLONE 4 MG/1
TABLET ORAL
Qty: 1 EACH | Refills: 0 | Status: SHIPPED | OUTPATIENT
Start: 2023-05-11

## 2023-07-17 ENCOUNTER — OFFICE VISIT (OUTPATIENT)
Facility: CLINIC | Age: 83
End: 2023-07-17
Payer: MEDICARE

## 2023-07-17 ENCOUNTER — LAB ENCOUNTER (OUTPATIENT)
Dept: LAB | Facility: REFERENCE LAB | Age: 83
End: 2023-07-17
Attending: FAMILY MEDICINE
Payer: MEDICARE

## 2023-07-17 VITALS
HEART RATE: 72 BPM | DIASTOLIC BLOOD PRESSURE: 80 MMHG | OXYGEN SATURATION: 98 % | HEIGHT: 69 IN | SYSTOLIC BLOOD PRESSURE: 132 MMHG | WEIGHT: 173 LBS | BODY MASS INDEX: 25.62 KG/M2

## 2023-07-17 DIAGNOSIS — G47.33 OSA (OBSTRUCTIVE SLEEP APNEA): ICD-10-CM

## 2023-07-17 DIAGNOSIS — I47.1 PSVT (PAROXYSMAL SUPRAVENTRICULAR TACHYCARDIA) (HCC): ICD-10-CM

## 2023-07-17 DIAGNOSIS — R35.1 BENIGN PROSTATIC HYPERPLASIA WITH NOCTURIA: ICD-10-CM

## 2023-07-17 DIAGNOSIS — N40.1 BENIGN PROSTATIC HYPERPLASIA WITH NOCTURIA: ICD-10-CM

## 2023-07-17 DIAGNOSIS — E55.9 VITAMIN D DEFICIENCY: ICD-10-CM

## 2023-07-17 DIAGNOSIS — I25.10 CORONARY ARTERY DISEASE INVOLVING NATIVE CORONARY ARTERY OF NATIVE HEART WITHOUT ANGINA PECTORIS: ICD-10-CM

## 2023-07-17 DIAGNOSIS — E03.8 OTHER SPECIFIED HYPOTHYROIDISM: ICD-10-CM

## 2023-07-17 DIAGNOSIS — Z00.00 ENCOUNTER FOR ANNUAL HEALTH EXAMINATION: Primary | ICD-10-CM

## 2023-07-17 DIAGNOSIS — H61.92 SKIN LESION OF LEFT EAR: ICD-10-CM

## 2023-07-17 DIAGNOSIS — E11.9 TYPE 2 DIABETES MELLITUS WITHOUT COMPLICATION, WITHOUT LONG-TERM CURRENT USE OF INSULIN (HCC): ICD-10-CM

## 2023-07-17 DIAGNOSIS — E78.49 OTHER HYPERLIPIDEMIA: ICD-10-CM

## 2023-07-17 DIAGNOSIS — Z12.83 SKIN CANCER SCREENING: ICD-10-CM

## 2023-07-17 DIAGNOSIS — I10 ESSENTIAL HYPERTENSION: ICD-10-CM

## 2023-07-17 LAB
EST. AVERAGE GLUCOSE BLD GHB EST-MCNC: 169 MG/DL (ref 68–126)
HBA1C MFR BLD: 7.5 % (ref ?–5.7)
VIT D+METAB SERPL-MCNC: 19.1 NG/ML (ref 30–100)

## 2023-07-17 PROCEDURE — 82306 VITAMIN D 25 HYDROXY: CPT

## 2023-07-17 PROCEDURE — 83036 HEMOGLOBIN GLYCOSYLATED A1C: CPT

## 2023-07-17 PROCEDURE — 36415 COLL VENOUS BLD VENIPUNCTURE: CPT

## 2023-07-18 ENCOUNTER — TELEPHONE (OUTPATIENT)
Facility: CLINIC | Age: 83
End: 2023-07-18

## 2023-07-18 NOTE — TELEPHONE ENCOUNTER
Patient Amarjit Manzo  calling ( identified name and  ) needs the printed referral for Dermatology    Son Priti Ramos has appointment at 1pm today , he can  the referral for this patient       [facility] staff please assist and thank you

## 2023-07-21 ENCOUNTER — TELEPHONE (OUTPATIENT)
Facility: CLINIC | Age: 83
End: 2023-07-21

## 2023-10-09 ENCOUNTER — OFFICE VISIT (OUTPATIENT)
Facility: CLINIC | Age: 83
End: 2023-10-09
Payer: MEDICARE

## 2023-10-09 VITALS
WEIGHT: 168 LBS | HEART RATE: 67 BPM | OXYGEN SATURATION: 99 % | SYSTOLIC BLOOD PRESSURE: 130 MMHG | DIASTOLIC BLOOD PRESSURE: 80 MMHG | HEIGHT: 69 IN | BODY MASS INDEX: 24.88 KG/M2

## 2023-10-09 DIAGNOSIS — E11.9 TYPE 2 DIABETES MELLITUS WITHOUT COMPLICATION, WITHOUT LONG-TERM CURRENT USE OF INSULIN (HCC): Primary | ICD-10-CM

## 2023-10-09 DIAGNOSIS — I10 ESSENTIAL HYPERTENSION: ICD-10-CM

## 2023-10-09 LAB
CARTRIDGE EXPIRATION DATE: ABNORMAL DATE
CARTRIDGE LOT#: 603 NUMERIC
HEMOGLOBIN A1C: 7.2 % (ref 4.3–5.6)

## 2023-10-09 RX ORDER — FINASTERIDE 5 MG/1
5 TABLET, FILM COATED ORAL DAILY
COMMUNITY
Start: 2023-09-09

## 2023-10-12 ENCOUNTER — TELEPHONE (OUTPATIENT)
Facility: CLINIC | Age: 83
End: 2023-10-12

## 2023-10-12 NOTE — TELEPHONE ENCOUNTER
The patient's wife is calling for a refill of her 's medication. She states they need it by Saturday because they are leaving town.      losartan 100 MG Oral Tab

## 2023-10-13 RX ORDER — LOSARTAN POTASSIUM 100 MG/1
100 TABLET ORAL DAILY
Qty: 90 TABLET | Refills: 3 | Status: SHIPPED | OUTPATIENT
Start: 2023-10-13

## 2023-10-13 NOTE — TELEPHONE ENCOUNTER
Refill passed per [place], [employer] protocol. Requested Prescriptions   Pending Prescriptions Disp Refills    LOSARTAN 100 MG Oral Tab [Pharmacy Med Name: Losartan Potassium 100 Mg Tab [facility]] 90 tablet 0     Sig: Take 1 tablet (100 mg total) by mouth daily.        Hypertensive Medications Protocol Passed - 10/12/2023 12:07 PM        Passed - In person appointment in the past 12 or next 3 months     Recent Outpatient Visits              4 days ago Type 2 diabetes mellitus without complication, without long-term current use of insulin ([facility].)    [address], [facility], [place] Leander Rossi, DO    Office Visit    2 months ago Encounter for annual health examination    [address] [facility], [place] Leander Rossi, DO    Office Visit    5 months ago Chronic left-sided low back pain with left-sided sciatica    [facility], [facility] Leander Rossi, DO    Office Visit    5 months ago Dizziness    [address] [facility], [place] Leander Rossi, DO    Office Visit    1 year ago [address] [facility], [place] Leander Rossi, DO    Office Visit          Future Appointments         Provider Department Appt Notes    In 3 months Leander Rossi DO [facility], [facility], [address] [facility] BP reading less than 140/90     BP Readings from Last 1 Encounters:  10/09/23 : 130/80              Passed - CMP or BMP in past 6 months     Recent Results (from the past 4392 hour(s))   Comp Metabolic Panel (14)    Collection Time: 04/18/23  1:29 PM   Result Value Ref Range    Glucose 223 (H) 70 - 99 mg/dL    Sodium 139 136 - 145 mmol/L    Potassium 4.0 3.5 - 5.1 mmol/L    Chloride 108 98 - 112 mmol/L    CO2 26.0 21.0 - 32.0 mmol/L    Anion Gap 5 0 - 18 mmol/L    BUN 14 7 - 18 mg/dL    Creatinine 1.02 0.70 - 1.30 mg/dL    BUN/CREA Ratio 13.7 10.0 - 20.0    Calcium, Total 9.2 8.5 - 10.1 mg/dL    Calculated Osmolality 295 275 - 295 mOsm/kg    eGFR-Cr 74 >=60 mL/min/1.73m2    ALT 38 16 - 61 U/L    AST 24 15 - 37 U/L    Alkaline Phosphatase 84 45 - 117 U/L    Bilirubin, Total 1.2 0.1 - 2.0 mg/dL    Total Protein 7.3 6.4 - 8.2 g/dL    Albumin 3.9 3.4 - 5.0 g/dL    Globulin  3.4 2.8 - 4.4 g/dL    A/G Ratio 1.1 1.0 - 2.0    Patient Fasting for CMP? No      *Note: Due to a large number of results and/or encounters for the requested time period, some results have not been displayed. A complete set of results can be found in Results Review.                Passed - In person appointment or virtual visit in the past 6 months     Recent Outpatient Visits              4 days ago Type 2 diabetes mellitus without complication, without long-term current use of insulin ([facility] [address]    [address], [address] [place], DO    Office Visit    2 months ago Encounter for annual health examination    [address] [facility], [facility]    Office Visit    5 months ago Chronic left-sided low back pain with left-sided sciatica    [facility], [address], [address], DO    Office Visit    5 months ago Dizziness    [address] [facility], DO    Office Visit    1 year ago [address] [facility], DO    Office Visit          Future Appointments         Provider Department Appt Notes    In 3 months [facility], [place], [address] or GFRNAA > 50     GFR Evaluation  EGFRCR: 74 , resulted on 4/18/2023             Recent Outpatient Visits              4 days ago Type 2 diabetes mellitus without complication, without long-term current use of insulin [facility])    [address], [facility] YEVGENIY Ta, DO    Office Visit    2 months ago Encounter for annual health examination    [place], [place]    Office Visit    5 months ago Chronic left-sided low back pain with left-sided sciatica    [facility], [address], [address] [place]    Office Visit    5 months ago Dizziness    [address] [facility], [place], [place]    Office Visit    1 year ago [address] [place], [place]    Office Visit          Future Appointments         Provider Department Appt Notes    In 3 months [place], [address], [address], [address]

## 2023-11-06 ENCOUNTER — NURSE TRIAGE (OUTPATIENT)
Facility: CLINIC | Age: 83
End: 2023-11-06

## 2023-11-06 ENCOUNTER — OFFICE VISIT (OUTPATIENT)
Facility: CLINIC | Age: 83
End: 2023-11-06
Payer: MEDICARE

## 2023-11-06 VITALS
BODY MASS INDEX: 25 KG/M2 | TEMPERATURE: 98 F | DIASTOLIC BLOOD PRESSURE: 60 MMHG | SYSTOLIC BLOOD PRESSURE: 120 MMHG | WEIGHT: 169.13 LBS | OXYGEN SATURATION: 97 % | HEART RATE: 61 BPM

## 2023-11-06 DIAGNOSIS — E11.9 TYPE 2 DIABETES MELLITUS WITHOUT COMPLICATION, WITHOUT LONG-TERM CURRENT USE OF INSULIN (HCC): ICD-10-CM

## 2023-11-06 DIAGNOSIS — Z23 NEED FOR VACCINATION: ICD-10-CM

## 2023-11-06 DIAGNOSIS — J40 BRONCHITIS: Primary | ICD-10-CM

## 2023-11-06 LAB — AMB EXT COVID-19 RESULT: NOT DETECTED

## 2023-11-06 PROCEDURE — 99213 OFFICE O/P EST LOW 20 MIN: CPT | Performed by: FAMILY MEDICINE

## 2023-11-06 RX ORDER — AZITHROMYCIN 250 MG/1
TABLET, FILM COATED ORAL
Qty: 6 TABLET | Refills: 0 | Status: SHIPPED | OUTPATIENT
Start: 2023-11-06 | End: 2023-11-10

## 2023-11-06 NOTE — TELEPHONE ENCOUNTER
Patient's home test was negative. Appointment made for today. Advised patient to wear mask to visit due to cough and sore throat. No appointment with Dr. Paris Celestin today. Offered tomorrow but patient would like to be seen today. Appointment made with Dr. Jeffery Enrique.      Future Appointments   Date Time Provider Radah Handy   11/6/2023  3:30 PM Dena Limon MD [facility] FP [facility] OP   1/15/2024  2:00 PM Syeda Conrad DO [facility] FP [facility] OP

## 2023-11-06 NOTE — TELEPHONE ENCOUNTER
Action Requested: Summary for Provider     []  Critical Lab, Recommendations Needed  [] Need Additional Advice  [x]   FYI    []   Need Orders  [] Need Medications Sent to Pharmacy  []  Other     SUMMARY: Patient recently traveled to [facility] and upon returning (Friday) developed sore scratchy throat, cough, cold- like symptoms, fatigue and body aches. Patient asking to be seen in the office. Patient has not tested for COVID. Patient is going to take a home test and call back. Patient also advised he may be seen in one of the immediate care locations without an appointment.        Reason for call: Cough/URI  Onset: 3 days ago

## 2024-01-15 ENCOUNTER — OFFICE VISIT (OUTPATIENT)
Facility: CLINIC | Age: 84
End: 2024-01-15
Payer: MEDICARE

## 2024-01-15 VITALS
SYSTOLIC BLOOD PRESSURE: 128 MMHG | BODY MASS INDEX: 25.33 KG/M2 | OXYGEN SATURATION: 98 % | HEART RATE: 79 BPM | WEIGHT: 171 LBS | HEIGHT: 69 IN | DIASTOLIC BLOOD PRESSURE: 78 MMHG

## 2024-01-15 DIAGNOSIS — E11.9 TYPE 2 DIABETES MELLITUS WITHOUT COMPLICATION, WITHOUT LONG-TERM CURRENT USE OF INSULIN (HCC): Primary | ICD-10-CM

## 2024-01-15 DIAGNOSIS — I10 ESSENTIAL HYPERTENSION: ICD-10-CM

## 2024-01-15 LAB
CARTRIDGE LOT#: 634 NUMERIC
HEMOGLOBIN A1C: 7.4 % (ref 4.3–5.6)

## 2024-01-15 NOTE — PROGRESS NOTES
HPI:    Patient ID: Pro Nur is a 83 year old male who presents for DM & HTN f/u.    HPI  Doing about the same since last visit.   Lifestyle hasn't changed much, although did eat more sweets likely over the holidays.     Past Medical History:   Diagnosis Date    BPH (benign prostatic hyperplasia)     CAD (coronary artery disease)     Essential hypertension     NAJMA (obstructive sleep apnea)     Other hyperlipidemia     Other specified hypothyroidism     Seasonal allergic rhinitis     SVT (supraventricular tachycardia)     Type 2 diabetes mellitus without complication, without long-term current use of insulin ([facility]) 09/2022        Current Outpatient Medications   Medication Sig Dispense Refill    losartan 100 MG Oral Tab Take 1 tablet (100 mg total) by mouth daily. 90 tablet 3    finasteride 5 MG Oral Tab Take 1 tablet (5 mg total) by mouth daily.      levothyroxine 75 MCG Oral Tab Take 1 tablet (75 mcg total) by mouth before breakfast. 90 tablet 3    atorvastatin 40 MG Oral Tab Take 1 tablet (40 mg total) by mouth daily. 90 tablet 3    Ascorbic Acid (VITAMIN C) 100 MG Oral Tab Take 1 tablet (100 mg total) by mouth daily. 90 tablet 1    aspirin EC 81 MG Oral Tab EC Take 1 tablet (81 mg total) by mouth daily. 90 tablet 1    B Complex-C-E-Zn (BEC/ZINC) Oral Tab Take 1 tablet by mouth daily. 90 tablet 1    Cholecalciferol (VITAMIN D3) 125 MCG (5000 UT) Oral Tab Take 1 tablet by mouth daily. 90 tablet 1    tamsulosin (FLOMAX) cap Take 1 capsule (0.4 mg total) by mouth daily. 90 capsule 1    Vitamin B-12 1000 MCG Oral Tab Take 1 tablet (1,000 mcg total) by mouth daily. 90 tablet 1        No Known Allergies    Review of Systems   Constitutional: Negative.    Respiratory: Negative.     Cardiovascular: Negative.    Gastrointestinal: Negative.    Neurological: Negative.    All other systems reviewed and are negative.           /78   Pulse 79   Ht 5' 9\" (1.753 m)   Wt 171 lb (77.6 kg)   SpO2 98%   BMI 25.25  kg/m²     PHYSICAL EXAM:   Physical Exam  Constitutional:       General: He is not in acute distress.     Appearance: Normal appearance. He is not ill-appearing, toxic-appearing or diaphoretic.   HENT:      Head: Normocephalic and atraumatic.   Eyes:      Extraocular Movements: Extraocular movements intact.      Conjunctiva/sclera: Conjunctivae normal.   Cardiovascular:      Rate and Rhythm: Normal rate and regular rhythm.      Pulses: Normal pulses.      Heart sounds: Normal heart sounds. No murmur heard.     No friction rub. No gallop.   Pulmonary:      Effort: Pulmonary effort is normal. No respiratory distress.      Breath sounds: Normal breath sounds. No wheezing, rhonchi or rales.   Musculoskeletal:      Cervical back: Neck supple.      Right lower leg: No edema.      Left lower leg: No edema.   Skin:     General: Skin is warm and dry.      Capillary Refill: Capillary refill takes less than 2 seconds.   Neurological:      General: No focal deficit present.      Mental Status: He is alert.   Psychiatric:         Mood and Affect: Mood normal.         Behavior: Behavior normal.         Thought Content: Thought content normal.         Judgment: Judgment normal.             ASSESSMENT/PLAN:     Encounter Diagnoses   Name Primary?    Type 2 diabetes mellitus without complication, without long-term current use of insulin (HCC) Yes    Essential hypertension        1. Type 2 diabetes mellitus without complication, without long-term current use of insulin (HCC)  - HEMOGLOBIN A1C    2. Essential hypertension     -POC A1c today: 7.4 (from 7.2% in 10/2023, down from 7.5% in 07/2023)  -Defers medication at this time, and will focus on lifestyle changes (particularly limiting sweets, and cutting back on bread).   -Plan to f/u in 3 months for repeat POC A1c. If fails to decrease, will then discuss medication (such as jardiance).   -Of note, on statin & ARB.   -HTN well-controlled. CPM.      Meds This Visit:  Requested  Prescriptions      No prescriptions requested or ordered in this encounter       Imaging & Referrals:  None       Fausto Cervantes DO  ID#[id number]

## 2024-01-26 ENCOUNTER — MED REC SCAN ONLY (OUTPATIENT)
Facility: CLINIC | Age: 84
End: 2024-01-26

## 2024-02-27 ENCOUNTER — TELEPHONE (OUTPATIENT)
Facility: CLINIC | Age: 84
End: 2024-02-27

## 2024-02-27 DIAGNOSIS — I10 ESSENTIAL HYPERTENSION: ICD-10-CM

## 2024-02-27 DIAGNOSIS — E78.49 OTHER HYPERLIPIDEMIA: ICD-10-CM

## 2024-02-27 NOTE — TELEPHONE ENCOUNTER
Wife contacts clinic requesting orders for labs needed by her cardiologist.  Would like to have them drawn tomorrow and requests message be sent to covering provider.  Copied from specialist note:    Lipid panel and CMP faxed to patient's PCP office     Pended for signature, please sign off if appropriate.

## 2024-02-28 ENCOUNTER — LAB ENCOUNTER (OUTPATIENT)
Dept: LAB | Facility: REFERENCE LAB | Age: 84
End: 2024-02-28
Attending: FAMILY MEDICINE
Payer: MEDICARE

## 2024-02-28 DIAGNOSIS — I10 ESSENTIAL HYPERTENSION: ICD-10-CM

## 2024-02-28 DIAGNOSIS — E78.49 OTHER HYPERLIPIDEMIA: ICD-10-CM

## 2024-02-28 LAB
ALBUMIN SERPL-MCNC: 4.4 G/DL (ref 3.2–4.8)
ALBUMIN/GLOB SERPL: 1.5 {RATIO} (ref 1–2)
ALP LIVER SERPL-CCNC: 76 U/L
ALT SERPL-CCNC: 28 U/L
ANION GAP SERPL CALC-SCNC: 8 MMOL/L (ref 0–18)
AST SERPL-CCNC: 23 U/L (ref ?–34)
BILIRUB SERPL-MCNC: 2 MG/DL (ref 0.2–1.1)
BUN BLD-MCNC: 13 MG/DL (ref 9–23)
BUN/CREAT SERPL: 12.7 (ref 10–20)
CALCIUM BLD-MCNC: 9.6 MG/DL (ref 8.7–10.4)
CHLORIDE SERPL-SCNC: 108 MMOL/L (ref 98–112)
CHOLEST SERPL-MCNC: 142 MG/DL (ref ?–200)
CO2 SERPL-SCNC: 26 MMOL/L (ref 21–32)
CREAT BLD-MCNC: 1.02 MG/DL
EGFRCR SERPLBLD CKD-EPI 2021: 74 ML/MIN/1.73M2 (ref 60–?)
FASTING PATIENT LIPID ANSWER: YES
FASTING STATUS PATIENT QL REPORTED: YES
GLOBULIN PLAS-MCNC: 3 G/DL (ref 2.8–4.4)
GLUCOSE BLD-MCNC: 155 MG/DL (ref 70–99)
HDLC SERPL-MCNC: 47 MG/DL (ref 40–59)
LDLC SERPL CALC-MCNC: 69 MG/DL (ref ?–100)
NONHDLC SERPL-MCNC: 95 MG/DL (ref ?–130)
OSMOLALITY SERPL CALC.SUM OF ELEC: 297 MOSM/KG (ref 275–295)
POTASSIUM SERPL-SCNC: 4.1 MMOL/L (ref 3.5–5.1)
PROT SERPL-MCNC: 7.4 G/DL (ref 5.7–8.2)
SODIUM SERPL-SCNC: 142 MMOL/L (ref 136–145)
TRIGL SERPL-MCNC: 154 MG/DL (ref 30–149)
VLDLC SERPL CALC-MCNC: 23 MG/DL (ref 0–30)

## 2024-02-28 PROCEDURE — 80061 LIPID PANEL: CPT

## 2024-02-28 PROCEDURE — 80053 COMPREHEN METABOLIC PANEL: CPT

## 2024-02-28 PROCEDURE — 36415 COLL VENOUS BLD VENIPUNCTURE: CPT

## 2024-03-08 ENCOUNTER — TELEPHONE (OUTPATIENT)
Facility: CLINIC | Age: 84
End: 2024-03-08

## 2024-03-08 NOTE — TELEPHONE ENCOUNTER
Patient wife Moon calling spouse also on the phone during call    She is wanting to tell us that cardiology office indicated that patient should see Dr. Cervantes for DM DX. Lab glucose ordered for cardio office in EPIC 155 reading.     Discussed with patient and wife that blood sugar is consistent with conversation he has with Dr. Cervantes in January that he has DM and needs to watch diet ect.     Patient has follow up visit 4/15/24 with Dr. Cervantes to follow up - wife was unaware and now has this written down.     No other questions or concerns at this time.

## 2024-04-15 ENCOUNTER — OFFICE VISIT (OUTPATIENT)
Facility: CLINIC | Age: 84
End: 2024-04-15
Payer: MEDICARE

## 2024-04-15 VITALS
OXYGEN SATURATION: 98 % | WEIGHT: 173 LBS | BODY MASS INDEX: 25.62 KG/M2 | HEIGHT: 69 IN | DIASTOLIC BLOOD PRESSURE: 66 MMHG | HEART RATE: 64 BPM | SYSTOLIC BLOOD PRESSURE: 130 MMHG

## 2024-04-15 DIAGNOSIS — E11.9 TYPE 2 DIABETES MELLITUS WITHOUT COMPLICATION, WITHOUT LONG-TERM CURRENT USE OF INSULIN (HCC): Primary | ICD-10-CM

## 2024-04-15 DIAGNOSIS — J30.2 SEASONAL ALLERGIC RHINITIS, UNSPECIFIED TRIGGER: ICD-10-CM

## 2024-04-15 LAB
CARTRIDGE LOT#: 634 NUMERIC
HEMOGLOBIN A1C: 7.3 % (ref 4.3–5.6)

## 2024-04-15 RX ORDER — FLUTICASONE PROPIONATE 50 MCG
2 SPRAY, SUSPENSION (ML) NASAL DAILY
Qty: 1 EACH | Refills: 2 | Status: SHIPPED | OUTPATIENT
Start: 2024-04-15

## 2024-04-15 NOTE — PROGRESS NOTES
HPI:    Patient ID: Pro Nur is a 83 year old male who presents for DM f/u.    HPI  Diet is not very good.   Exercise is limited 2/2 weather. Plans to resume regular exercise at park.   Has ophtho appt in May with Dr. Maldonado.     Has chronic allergies. Worse this time of year. Has been sneezing more with watery eyes. Uses flonase intermittently.     Past Medical History:    BPH (benign prostatic hyperplasia)    CAD (coronary artery disease)    Essential hypertension    NAJMA (obstructive sleep apnea)    Other hyperlipidemia    Other specified hypothyroidism    Seasonal allergic rhinitis    SVT (supraventricular tachycardia)    Type 2 diabetes mellitus without complication, without long-term current use of insulin ([facility])        Current Outpatient Medications   Medication Sig Dispense Refill    fluticasone propionate 50 MCG/ACT Nasal Suspension 2 sprays by Each Nare route daily. 1 each 2    losartan 100 MG Oral Tab Take 1 tablet (100 mg total) by mouth daily. 90 tablet 3    finasteride 5 MG Oral Tab Take 1 tablet (5 mg total) by mouth daily.      levothyroxine 75 MCG Oral Tab Take 1 tablet (75 mcg total) by mouth before breakfast. 90 tablet 3    atorvastatin 40 MG Oral Tab Take 1 tablet (40 mg total) by mouth daily. 90 tablet 3    Ascorbic Acid (VITAMIN C) 100 MG Oral Tab Take 1 tablet (100 mg total) by mouth daily. 90 tablet 1    aspirin EC 81 MG Oral Tab EC Take 1 tablet (81 mg total) by mouth daily. 90 tablet 1    B Complex-C-E-Zn (BEC/ZINC) Oral Tab Take 1 tablet by mouth daily. 90 tablet 1    Cholecalciferol (VITAMIN D3) 125 MCG (5000 UT) Oral Tab Take 1 tablet by mouth daily. 90 tablet 1    tamsulosin (FLOMAX) cap Take 1 capsule (0.4 mg total) by mouth daily. 90 capsule 1    Vitamin B-12 1000 MCG Oral Tab Take 1 tablet (1,000 mcg total) by mouth daily. 90 tablet 1        No Known Allergies    Review of Systems   Constitutional: Negative.    HENT:  Positive for sneezing.    Respiratory: Negative.      Cardiovascular: Negative.    Gastrointestinal: Negative.    Neurological: Negative.    All other systems reviewed and are negative.           /66   Pulse 64   Ht 5' 9\" (1.753 m)   Wt 173 lb (78.5 kg)   SpO2 98%   BMI 25.55 kg/m²     PHYSICAL EXAM:   Physical Exam  Constitutional:       General: He is not in acute distress.     Appearance: Normal appearance. He is not ill-appearing, toxic-appearing or diaphoretic.   HENT:      Head: Normocephalic and atraumatic.   Eyes:      Extraocular Movements: Extraocular movements intact.      Conjunctiva/sclera: Conjunctivae normal.   Cardiovascular:      Rate and Rhythm: Normal rate and regular rhythm.      Pulses: Normal pulses.      Heart sounds: Normal heart sounds. No murmur heard.     No friction rub. No gallop.   Pulmonary:      Effort: Pulmonary effort is normal. No respiratory distress.      Breath sounds: Normal breath sounds. No wheezing, rhonchi or rales.   Musculoskeletal:      Cervical back: Neck supple.      Right lower leg: No edema.      Left lower leg: No edema.   Skin:     General: Skin is warm and dry.      Capillary Refill: Capillary refill takes less than 2 seconds.   Neurological:      General: No focal deficit present.      Mental Status: He is alert.   Psychiatric:         Mood and Affect: Mood normal.         Behavior: Behavior normal.         Thought Content: Thought content normal.         Judgment: Judgment normal.             ASSESSMENT/PLAN:     Encounter Diagnoses   Name Primary?    Type 2 diabetes mellitus without complication, without long-term current use of insulin (HCC) Yes    Seasonal allergic rhinitis, unspecified trigger        1. Type 2 diabetes mellitus without complication, without long-term current use of insulin (HCC)  - HEMOGLOBIN A1C  - OPHTHALMOLOGY - INTERNAL  -POC A1c today: 7.3% (from 7.4% in 01/2024, 7.2% in 10/2023, 7.5% in 07/2023)  -Defers medication at this time, and will focus on continued lifestyle changes  (particularly limiting sweets, and cutting back on bread).   -Repeat A1c in 3 months. Consider jardiance if A1c increases in future.   -Of note, on statin & ARB.   -Has ophtho appt next month.   -RTC in 2024 for f/u & medicare annual visit. Repeat A1c with annual labs at that time.     2. Seasonal allergic rhinitis, unspecified trigger  -Chronic and uncontrolled.   -Resume flonase and encouraged to use daily.     Meds This Visit:  Requested Prescriptions     Signed Prescriptions Disp Refills    fluticasone propionate 50 MCG/ACT Nasal Suspension 1 each 2     Si sprays by Each Nare route daily.       Imaging & Referrals:  OPHTHALMOLOGY - INTERNAL       Fausto Cervantes, DO  ID#[id number]

## 2024-05-21 RX ORDER — LEVOTHYROXINE SODIUM 0.07 MG/1
75 TABLET ORAL
Qty: 90 TABLET | Refills: 0 | Status: SHIPPED | OUTPATIENT
Start: 2024-05-21

## 2024-05-21 NOTE — TELEPHONE ENCOUNTER
Please Review. Protocol Failed; No Protocol   No Active/ Future labs pended    Requested Prescriptions   Pending Prescriptions Disp Refills    LEVOTHYROXINE 75 MCG Oral Tab [Pharmacy Med Name: Levothyroxine Sodium 75 Mcg Tab Lupi] 90 tablet 0     Sig: TAKE ONE TABLET BY MOUTH BEFORE BREAKFAST       Thyroid Medication Protocol Failed - 5/18/2024 10:16 AM        Failed - TSH in past 12 months        Passed - Last TSH value is normal     Lab Results   Component Value Date    TSH 2.040 04/18/2023                 Passed - In person appointment or virtual visit in the past 12 mos or appointment in next 3 mos     Recent Outpatient Visits              1 month ago Type 2 diabetes mellitus without complication, without long-term current use of insulin ([facility])    [facility] Fausto Cervantes DO    Office Visit    4 months ago Type 2 diabetes mellitus without complication, without long-term current use of insulin ([facility])    [facility] Fausto Cervantes DO    Office Visit    6 months ago Bronchitis    [facility] Shana Chaudhary MD    Office Visit    7 months ago Type 2 diabetes mellitus without complication, without long-term current use of insulin ([facility])    [facility] Fausto Cervantes DO    Office Visit    10 months ago Encounter for annual health examination    [facility] Fausto Cervantes DO    Office Visit          Future Appointments         Provider Department Appt Notes    In 2 months Fausto Cervantes DO [facility]                            Future Appointments         Provider Department Appt Notes    In 2 months Fausto Cervantes DO [facility]           Recent Outpatient Visits              1 month ago Type 2 diabetes mellitus without  complication, without long-term current use of insulin (H[facility] [place]     [place] Visit    4 months ago Type 2 diabetes mellitus without complication, without long-term current use of insulin ([facility])    [facility] [address] [place]     Office Visit    6 months ago Bronchitis    [facility] [place] [place] [facility]    Office Visit    7 months ago Type 2 diabetes mellitus without complication, without long-term current use of insulin ([facility])    [facility] [place] [place]    Office Visit    10 months ago Encounter for annual health examination    [facility] Mcke[place] Michael, DO    Office Visit

## 2024-07-18 ENCOUNTER — TELEPHONE (OUTPATIENT)
Facility: CLINIC | Age: 84
End: 2024-07-18

## 2024-07-18 DIAGNOSIS — E55.9 VITAMIN D DEFICIENCY: ICD-10-CM

## 2024-07-18 DIAGNOSIS — Z00.00 ENCOUNTER FOR ANNUAL PHYSICAL EXAM: Primary | ICD-10-CM

## 2024-07-18 DIAGNOSIS — E78.49 OTHER HYPERLIPIDEMIA: ICD-10-CM

## 2024-07-18 DIAGNOSIS — Z01.84 IMMUNITY STATUS TESTING: ICD-10-CM

## 2024-07-18 DIAGNOSIS — Z87.898 HISTORY OF PREDIABETES: ICD-10-CM

## 2024-07-18 NOTE — TELEPHONE ENCOUNTER
Patient's wife on release form called:    Patient has upcoming physical appointment scheduled for 7/22/24 and is requesting orders for required blood tests    Pended for review    She is also asking for a blood test to see if he's ever had chicken pox because she and her  were looking into the shingles vaccine but it's not covered on their insurance and would cost $200 so someone told her to get a blood test to see if she's had chicken pox    Please advise, thanks    Rn's please contact patient at [phone] if testing is ordered( ok to leave message)

## 2024-07-18 NOTE — TELEPHONE ENCOUNTER
Advised patient's wife on release form, of Dr Cervantes's  note. Patient's wife verbalized understanding and had no further questions.

## 2024-07-19 ENCOUNTER — LAB ENCOUNTER (OUTPATIENT)
Dept: LAB | Age: 84
End: 2024-07-19
Attending: FAMILY MEDICINE
Payer: MEDICARE

## 2024-07-19 DIAGNOSIS — E55.9 VITAMIN D DEFICIENCY: ICD-10-CM

## 2024-07-19 DIAGNOSIS — Z00.00 ENCOUNTER FOR ANNUAL PHYSICAL EXAM: ICD-10-CM

## 2024-07-19 DIAGNOSIS — Z01.84 IMMUNITY STATUS TESTING: ICD-10-CM

## 2024-07-19 LAB
ALBUMIN SERPL-MCNC: 4.6 G/DL (ref 3.2–4.8)
ALBUMIN/GLOB SERPL: 1.5 {RATIO} (ref 1–2)
ALP LIVER SERPL-CCNC: 86 U/L
ALT SERPL-CCNC: 25 U/L
ANION GAP SERPL CALC-SCNC: 6 MMOL/L (ref 0–18)
AST SERPL-CCNC: 25 U/L (ref ?–34)
BILIRUB SERPL-MCNC: 2.3 MG/DL (ref 0.2–1.1)
BUN BLD-MCNC: 11 MG/DL (ref 9–23)
BUN/CREAT SERPL: 10.9 (ref 10–20)
CALCIUM BLD-MCNC: 10 MG/DL (ref 8.7–10.4)
CHLORIDE SERPL-SCNC: 108 MMOL/L (ref 98–112)
CHOLEST SERPL-MCNC: 133 MG/DL (ref ?–200)
CO2 SERPL-SCNC: 26 MMOL/L (ref 21–32)
COMPLEXED PSA SERPL-MCNC: 2.54 NG/ML (ref ?–4)
CREAT BLD-MCNC: 1.01 MG/DL
CREAT UR-SCNC: 97.6 MG/DL
EGFRCR SERPLBLD CKD-EPI 2021: 74 ML/MIN/1.73M2 (ref 60–?)
EST. AVERAGE GLUCOSE BLD GHB EST-MCNC: 169 MG/DL (ref 68–126)
FASTING PATIENT LIPID ANSWER: YES
FASTING STATUS PATIENT QL REPORTED: YES
GLOBULIN PLAS-MCNC: 3.1 G/DL (ref 2–3.5)
GLUCOSE BLD-MCNC: 161 MG/DL (ref 70–99)
HBA1C MFR BLD: 7.5 % (ref ?–5.7)
HDLC SERPL-MCNC: 45 MG/DL (ref 40–59)
LDLC SERPL CALC-MCNC: 61 MG/DL (ref ?–100)
MICROALBUMIN UR-MCNC: <0.3 MG/DL
NONHDLC SERPL-MCNC: 88 MG/DL (ref ?–130)
OSMOLALITY SERPL CALC.SUM OF ELEC: 293 MOSM/KG (ref 275–295)
POTASSIUM SERPL-SCNC: 4.2 MMOL/L (ref 3.5–5.1)
PROT SERPL-MCNC: 7.7 G/DL (ref 5.7–8.2)
SODIUM SERPL-SCNC: 140 MMOL/L (ref 136–145)
TRIGL SERPL-MCNC: 158 MG/DL (ref 30–149)
TSI SER-ACNC: 2.19 MIU/ML (ref 0.55–4.78)
VIT D+METAB SERPL-MCNC: 18.2 NG/ML (ref 30–100)
VLDLC SERPL CALC-MCNC: 23 MG/DL (ref 0–30)

## 2024-07-19 PROCEDURE — 86787 VARICELLA-ZOSTER ANTIBODY: CPT

## 2024-07-19 PROCEDURE — 80053 COMPREHEN METABOLIC PANEL: CPT | Performed by: FAMILY MEDICINE

## 2024-07-19 PROCEDURE — 82306 VITAMIN D 25 HYDROXY: CPT

## 2024-07-19 PROCEDURE — 83036 HEMOGLOBIN GLYCOSYLATED A1C: CPT | Performed by: FAMILY MEDICINE

## 2024-07-19 PROCEDURE — 82043 UR ALBUMIN QUANTITATIVE: CPT | Performed by: FAMILY MEDICINE

## 2024-07-19 PROCEDURE — 36415 COLL VENOUS BLD VENIPUNCTURE: CPT | Performed by: FAMILY MEDICINE

## 2024-07-19 PROCEDURE — 84443 ASSAY THYROID STIM HORMONE: CPT | Performed by: FAMILY MEDICINE

## 2024-07-19 PROCEDURE — 80061 LIPID PANEL: CPT | Performed by: FAMILY MEDICINE

## 2024-07-19 PROCEDURE — 82570 ASSAY OF URINE CREATININE: CPT | Performed by: FAMILY MEDICINE

## 2024-07-22 ENCOUNTER — OFFICE VISIT (OUTPATIENT)
Facility: CLINIC | Age: 84
End: 2024-07-22
Payer: MEDICARE

## 2024-07-22 VITALS
WEIGHT: 168 LBS | HEART RATE: 72 BPM | HEIGHT: 66 IN | DIASTOLIC BLOOD PRESSURE: 70 MMHG | BODY MASS INDEX: 27 KG/M2 | RESPIRATION RATE: 16 BRPM | SYSTOLIC BLOOD PRESSURE: 132 MMHG

## 2024-07-22 DIAGNOSIS — E11.9 TYPE 2 DIABETES MELLITUS WITHOUT COMPLICATION, WITHOUT LONG-TERM CURRENT USE OF INSULIN (HCC): ICD-10-CM

## 2024-07-22 DIAGNOSIS — N40.1 BENIGN PROSTATIC HYPERPLASIA WITH NOCTURIA: ICD-10-CM

## 2024-07-22 DIAGNOSIS — I10 ESSENTIAL HYPERTENSION: ICD-10-CM

## 2024-07-22 DIAGNOSIS — K30 INDIGESTION: ICD-10-CM

## 2024-07-22 DIAGNOSIS — R35.1 BENIGN PROSTATIC HYPERPLASIA WITH NOCTURIA: ICD-10-CM

## 2024-07-22 DIAGNOSIS — G47.33 OSA (OBSTRUCTIVE SLEEP APNEA): ICD-10-CM

## 2024-07-22 DIAGNOSIS — I47.10 PSVT (PAROXYSMAL SUPRAVENTRICULAR TACHYCARDIA) (HCC): ICD-10-CM

## 2024-07-22 DIAGNOSIS — R42 VERTIGO: ICD-10-CM

## 2024-07-22 DIAGNOSIS — E03.8 OTHER SPECIFIED HYPOTHYROIDISM: ICD-10-CM

## 2024-07-22 DIAGNOSIS — Z00.00 ENCOUNTER FOR ANNUAL HEALTH EXAMINATION: Primary | ICD-10-CM

## 2024-07-22 DIAGNOSIS — E78.49 OTHER HYPERLIPIDEMIA: ICD-10-CM

## 2024-07-22 DIAGNOSIS — H91.93 DECREASED HEARING OF BOTH EARS: ICD-10-CM

## 2024-07-22 DIAGNOSIS — J30.2 SEASONAL ALLERGIC RHINITIS, UNSPECIFIED TRIGGER: ICD-10-CM

## 2024-07-22 DIAGNOSIS — I25.10 CORONARY ARTERY DISEASE INVOLVING NATIVE CORONARY ARTERY OF NATIVE HEART WITHOUT ANGINA PECTORIS: ICD-10-CM

## 2024-07-22 LAB
V ZOSTER IGM: <0.91 INDEX
VZV IGG SER IA-ACNC: 1087 (ref 165–?)

## 2024-07-22 NOTE — PROGRESS NOTES
Subjective:   Pro Nur is a 84 year old male who presents for a Medicare Wellness Visit charge within the last 11 months and Patient may not meet criteria for AWV: Please evaluate for correct coding and scheduled follow up of multiple significant but stable problems.     Wife states he has decreased hearing.     Still seeing cardiology Dr. Estrella at . Sees yearly.     Saw ophtho Dr. Adeola Maldonado. Last saw in 05/2024.      Sees urology regularly. Was started on finasteride following visit in 05/2023.      Exercises/stretches most days of the week.      [place] [facility] [place]. This was normal. Was recommended to repeat in 10 years. Has had 4 colonoscopies thus far.   Last PSA level: Hx of BPH. Sees Dr. Virgen.   Immunizations: Covid: UTD, Shingles: Never (costs $200 but will consider). PPSV23: 10/2020    History/Other:   Fall Risk Assessment:   He has been screened for Falls and is low risk.      Cognitive Assessment:   He had a completely normal cognitive assessment - see flowsheet entries       Functional Ability/Status:   Pro Nur has some abnormal functions as listed below:  He has Vision problems based on screening of functional status.       Depression Screening (PHQ):  PHQ-2 SCORE: 0  , done 7/22/2024   If you checked off any problems, how difficult have these problems made it for you to do your work, take care of things at home, or get along with other people?: Not difficult at all              Advanced Directives:   He does have a Living Will but we do NOT have it on file in Epic.    He does have a POA but we do NOT have it on file in Epic.    Not discussed      Patient Active Problem List   Diagnosis    Seasonal allergic rhinitis    Other specified hypothyroidism    Essential hypertension    Other hyperlipidemia    Coronary artery disease involving native coronary artery of native heart    NAJMA (obstructive sleep apnea)    Benign prostatic  hyperplasia with nocturia    Vertigo    Indigestion    PSVT (paroxysmal supraventricular tachycardia) ([facility])    Type 2 diabetes mellitus without complication, without long-term current use of insulin ([facility])     Allergies:  He has No Known Allergies.    Current Medications:  Outpatient Medications Marked as Taking for the 7/22/24 encounter (Office Visit) with Fausto Cervantes DO   Medication Sig    empagliflozin 10 MG Oral Tab Take 1 tablet (10 mg total) by mouth daily.    levothyroxine 75 MCG Oral Tab Take 1 tablet (75 mcg total) by mouth before breakfast.    fluticasone propionate 50 MCG/ACT Nasal Suspension 2 sprays by Each Nare route daily.    losartan 100 MG Oral Tab Take 1 tablet (100 mg total) by mouth daily.    finasteride 5 MG Oral Tab Take 1 tablet (5 mg total) by mouth daily.    atorvastatin 40 MG Oral Tab Take 1 tablet (40 mg total) by mouth daily.    Ascorbic Acid (VITAMIN C) 100 MG Oral Tab Take 1 tablet (100 mg total) by mouth daily.    aspirin EC 81 MG Oral Tab EC Take 1 tablet (81 mg total) by mouth daily.    B Complex-C-E-Zn (BEC/ZINC) Oral Tab Take 1 tablet by mouth daily.    Cholecalciferol (VITAMIN D3) 125 MCG (5000 UT) Oral Tab Take 1 tablet by mouth daily.    tamsulosin (FLOMAX) cap Take 1 capsule (0.4 mg total) by mouth daily.    Vitamin B-12 1000 MCG Oral Tab Take 1 tablet (1,000 mcg total) by mouth daily.       Medical History:  He  has a past medical history of BPH (benign prostatic hyperplasia), CAD (coronary artery disease), Essential hypertension, NAJMA (obstructive sleep apnea), Other hyperlipidemia, Other specified hypothyroidism, Seasonal allergic rhinitis, SVT (supraventricular tachycardia) ([facility]), and Type 2 diabetes mellitus without complication, without long-term current use of insulin ([facility]) (09/2022).  Surgical History:  He  has no past surgical history on file.   Family History:  His family history is not on file.  Social History:  He  reports that he has never smoked. He has never  used smokeless tobacco. He reports that he does not drink alcohol and does not use drugs.    Tobacco:  He has never smoked tobacco.    CAGE Alcohol Screen:   CAGE screening score of 0 on 7/22/2024, showing low risk of alcohol abuse.      Patient Care Team:  Fausto Cervantes DO as PCP - General (Family Medicine)  Oswaldo Garvey MD (CARDIOLOGY)  Anne Huang MD (Cardiovascular Diseases)  Ricardo Virgen (UROLOGY)  Adeola Maldonado (OPHTHALMOLOGY)  Waqas Medeiros MD (Cardiovascular Diseases)    Review of Systems  GENERAL: feels well otherwise. See HPI  SKIN: denies any unusual skin lesions  EYES: denies blurred vision or double vision  HEENT: denies nasal congestion, sinus pain or ST  LUNGS: denies shortness of breath with exertion  CARDIOVASCULAR: denies chest pain on exertion  GI: denies abdominal pain, denies heartburn  : no complaint of urinary incontinence  MUSCULOSKELETAL: denies back pain  NEURO: denies headaches  PSYCHE: denies depression or anxiety  HEMATOLOGIC: denies hx of anemia  ENDOCRINE: + thyroid history  ALL/ASTHMA: see hpi    Objective:   Physical Exam  General Appearance:  Alert, cooperative, no distress, appears stated age   Head:  Normocephalic, without obvious abnormality, atraumatic   Eyes:  PERRL, conjunctiva/corneas clear, EOM's intact, both eyes   Ears:  Normal TM's and external ear canals, both ears   Nose: Nares normal, septum midline, mucosa normal, no drainage or sinus tenderness   Throat: Lips, mucosa, and tongue normal; teeth and gums normal   Neck: Supple, symmetrical, trachea midline, no adenopathy, thyroid: not enlarged, symmetric, no tenderness/mass/nodules, no carotid bruit or JVD   Back:   Symmetric, no curvature, ROM normal, no CVA tenderness   Lungs:   Clear to auscultation bilaterally, respirations unlabored   Chest Wall:  No tenderness or deformity   Heart:  Regular rate and rhythm, S1, S2 normal, no murmur, rub or gallop   Abdomen:   Soft, non-tender, bowel  sounds active all four quadrants,  no masses, no organomegaly           Extremities: Extremities normal, atraumatic, no cyanosis or edema   Pulses: 2+ and symmetric   Skin: Skin color, texture, turgor normal, no rashes or lesions   Lymph nodes: Cervical, supraclavicular, and axillary nodes normal   Neurologic: Normal     /70 (BP Location: Left arm, Patient Position: Sitting, Cuff Size: adult)   Pulse 72   Resp 16   Ht 5' 6\" (1.676 m)   Wt 168 lb (76.2 kg)   BMI 27.12 kg/m²  Estimated body mass index is 27.12 kg/m² as calculated from the following:    Height as of this encounter: 5' 6\" (1.676 m).    Weight as of this encounter: 168 lb (76.2 kg).    Medicare Hearing Assessment:   Hearing Screening    Time taken: 7/22/2024  1:23 PM  Entry User: Hamida Nur CMA  Screening Method: Finger Rub         Visual Acuity:   Right Eye Visual Acuity: Corrected Right Eye Chart Acuity: 20/25   Left Eye Visual Acuity: Corrected Left Eye Chart Acuity: 20/25   Both Eyes Visual Acuity: Corrected Both Eyes Chart Acuity: 20/20   Able To Tolerate Visual Acuity: Yes        Assessment & Plan:   Pro Nur is a 84 year old male who presents for a Medicare Assessment.     1. Encounter for annual health examination (Primary)  2. Essential hypertension  -     Empagliflozin; Take 1 tablet (10 mg total) by mouth daily.  Dispense: 90 tablet; Refill: 1  3. Other hyperlipidemia  4. Coronary artery disease involving native coronary artery of native heart without angina pectoris  -     Empagliflozin; Take 1 tablet (10 mg total) by mouth daily.  Dispense: 90 tablet; Refill: 1  5. Type 2 diabetes mellitus without complication, without long-term current use of insulin (HCC)  -     Empagliflozin; Take 1 tablet (10 mg total) by mouth daily.  Dispense: 90 tablet; Refill: 1  6. PSVT (paroxysmal supraventricular tachycardia) ([facility])  7. Other specified hypothyroidism  8. NAJMA (obstructive sleep apnea)  9. Benign prostatic hyperplasia with  nocturia  10. Seasonal allergic rhinitis, unspecified trigger  11. Vertigo  12. Indigestion  13. Decreased hearing of both ears  -     Audiology Referral - In Network    -Reviewed recent lab panel.   -DM: Recent A1c 7.5%. No prior medications. Recommend starting jardiance 10mg daily if covered/affordable. Medical record release form signed & faxed today to obtain ophtho exam results.   -Hypothyroidism: TSH recently normal. CPM.  -HTN/HLD/CAD/PSVT: Sees cardiology.   -BPH: Sees urology.   -Vit D defic: Resume daily supplement.   -Decreased hearing: Check formal hearing screen.   -NAJMA: CPM.   -AR: No current concerns.   -Indigestion: No current concerns.  -Vertigo: No current concerns.   -RTC in 3-4 months for repeat POC A1c.     The patient indicates understanding of these issues and agrees to the plan.  Lab work ordered.  Reinforced healthy diet, lifestyle, and exercise.      No follow-ups on file.     Fausto Cervantes DO, 7/22/2024     Supplementary Documentation:   General Health:  In the past six months, have you lost more than 10 pounds without trying?: 2 - No  Has your appetite been poor?: No  Type of Diet: Other  How does the patient maintain a good energy level?: Daily Walks  How would you describe your daily physical activity?: Moderate  How would you describe your current health state?: Good  How do you maintain positive mental well-being?: Puzzles  On a scale of 0 to 10, with 0 being no pain and 10 being severe pain, what is your pain level?: 0 - (None)  In the past six months, have you experienced urine leakage?: 0-No  At any time do you feel concerned for the safety/well-being of yourself and/or your children, in your home or elsewhere?: No  Have you had any immunizations at another office such as Influenza, Hepatitis B, Tetanus, or Pneumococcal?: No    Health Maintenance   Topic Date Due    Diabetes Care Foot Exam  Never done    Diabetes Care Dilated Eye Exam  Never done    Zoster Vaccines (1 of 2) Never  done    Pneumococcal Vaccine: 65+ Years (2 of 2 - PCV) 10/08/2021    COVID-19 Vaccine (6 - 2023-24 season) 02/07/2024    Annual Physical  07/17/2024    Influenza Vaccine (1) 10/01/2024    Diabetes Care A1C  01/19/2025    Diabetes Care: GFR  07/19/2025    Diabetes Care: Microalb/Creat Ratio  07/19/2025    Annual Depression Screening  Completed    Fall Risk Screening (Annual)  Completed

## 2024-07-23 ENCOUNTER — TELEPHONE (OUTPATIENT)
Facility: CLINIC | Age: 84
End: 2024-07-23

## 2024-07-23 NOTE — TELEPHONE ENCOUNTER
Patient was prescribed Empagliflozin yesterday. It is cost prohibitive at 550$ for 90 days.  Patient is asking for an alternative I checked with the pharmacist and there are no cheaper options in that category of meds.  Please advise.

## 2024-07-25 NOTE — TELEPHONE ENCOUNTER
Spoke to pt over phone. Will start metformin instead. To start 500mg daily x1 week, then increase to BID. SE profile reviewed. To call if any issues.

## 2024-08-14 RX ORDER — LOSARTAN POTASSIUM 100 MG/1
100 TABLET ORAL DAILY
Qty: 90 TABLET | Refills: 3 | Status: SHIPPED | OUTPATIENT
Start: 2024-08-14

## 2024-08-14 RX ORDER — LEVOTHYROXINE SODIUM 0.07 MG/1
75 TABLET ORAL
Qty: 90 TABLET | Refills: 3 | Status: SHIPPED | OUTPATIENT
Start: 2024-08-14

## 2024-08-14 NOTE — TELEPHONE ENCOUNTER
Refill passed per [facility] protocol.  Requested Prescriptions   Pending Prescriptions Disp Refills    LEVOTHYROXINE 75 MCG Oral Tab [Pharmacy Med Name: Levothyroxine Sodium 75 Mcg Tab Lupi] 90 tablet 0     Sig: Take 1 tablet (75 mcg total) by mouth before breakfast.       Thyroid Medication Protocol Passed - 8/10/2024 12:14 PM        Passed - TSH in past 12 months        Passed - Last TSH value is normal     Lab Results   Component Value Date    TSH 2.187 07/19/2024                 Passed - In person appointment or virtual visit in the past 12 mos or appointment in next 3 mos     Recent Outpatient Visits              3 weeks ago Encounter for annual health examination    [facility] Fausto Cervantes DO    Office Visit    4 months ago Type 2 diabetes mellitus without complication, without long-term current use of insulin ([facility])    [facility] Fausto Cervantes DO    Office Visit    7 months ago Type 2 diabetes mellitus without complication, without long-term current use of insulin ([facility])    [facility] Fausto Cervantes DO    Office Visit    9 months ago Bronchitis    [facility] Shana Chaudhary MD    Office Visit    10 months ago Type 2 diabetes mellitus without complication, without long-term current use of insulin ([facility])    [facility] Fausto Cervantes DO    Office Visit          Future Appointments         Provider Department Appt Notes    In 2 months Fausto Cervantes DO [facility]                       LOSARTAN 100 MG Oral Tab [Pharmacy Med Name: Losartan Potassium 100 Mg Tab Camb] 90 tablet 0     Sig: Take 1 tablet (100 mg total) by mouth daily.       Hypertension Medications Protocol Passed - 8/10/2024 12:14 PM        Passed - CMP or BMP in past 12 months         Passed - Last BP reading less than 140/90     BP Readings from Last 1 Encounters:   07/22/24 132/70               Passed - In person appointment or virtual visit in the past 12 mos or appointment in next 3 mos     Recent Outpatient Visits              3 weeks ago Encounter for annual health examination    [facility] Fausto Cervantes DO    Office Visit    4 months ago Type 2 diabetes mellitus without complication, without long-term current use of insulin (HCC)    [facility] Fausto Cervantes,     Office Visit    7 months ago Type 2 diabetes mellitus without complication, without long-term current use of insulin (HCC)    [facility] Fausto Cervantes,     Office Visit    9 months ago Bronchitis    [facility] Shana Chaudhary MD    Office Visit    10 months ago Type 2 diabetes mellitus without complication, without long-term current use of insulin (HCC)    [facility] Fausto Cervantes,     Office Visit          Future Appointments         Provider Department Appt Notes    In 2 months Fausto Cervantes DO [facility]                     Passed - EGFRCR or GFRNAA > 50     GFR Evaluation  EGFRCR: 74 , resulted on 7/19/2024             Recent Outpatient Visits              3 weeks ago Encounter for annual health examination    [facility] Fausto Cervantes DO    Office Visit    4 months ago Type 2 diabetes mellitus without complication, without long-term current use of insulin (HCC)    [facility] Fausto Cervantes DO    Office Visit    7 months ago Type 2 diabetes mellitus without complication, without long-term current use of insulin (HCC)    [facility]  Fausto Cervantes DO    Office Visit    9 months ago Bronchitis    [facility] Shana Chaudhary MD    Office Visit    10 months ago Type 2 diabetes mellitus without complication, without long-term current use of insulin (HCC)    [facility] Fausto Cervantes DO    Office Visit          Future Appointments         Provider Department Appt Notes    In 2 months Fausto Cervantes DO [facility]

## 2024-10-22 ENCOUNTER — OFFICE VISIT (OUTPATIENT)
Facility: CLINIC | Age: 84
End: 2024-10-22
Payer: MEDICARE

## 2024-10-22 VITALS
DIASTOLIC BLOOD PRESSURE: 68 MMHG | WEIGHT: 168 LBS | SYSTOLIC BLOOD PRESSURE: 124 MMHG | OXYGEN SATURATION: 98 % | HEART RATE: 72 BPM | HEIGHT: 66 IN | BODY MASS INDEX: 27 KG/M2

## 2024-10-22 DIAGNOSIS — K59.00 CONSTIPATION, UNSPECIFIED CONSTIPATION TYPE: ICD-10-CM

## 2024-10-22 DIAGNOSIS — E11.9 TYPE 2 DIABETES MELLITUS WITHOUT COMPLICATION, WITHOUT LONG-TERM CURRENT USE OF INSULIN (HCC): Primary | ICD-10-CM

## 2024-10-22 LAB
CARTRIDGE EXPIRATION DATE: ABNORMAL DATE
HEMOGLOBIN A1C: 7 % (ref 4.3–5.6)

## 2024-10-22 PROCEDURE — G0008 ADMIN INFLUENZA VIRUS VAC: HCPCS | Performed by: FAMILY MEDICINE

## 2024-10-22 PROCEDURE — 83036 HEMOGLOBIN GLYCOSYLATED A1C: CPT | Performed by: FAMILY MEDICINE

## 2024-10-22 PROCEDURE — 90662 IIV NO PRSV INCREASED AG IM: CPT | Performed by: FAMILY MEDICINE

## 2024-10-22 PROCEDURE — 99214 OFFICE O/P EST MOD 30 MIN: CPT | Performed by: FAMILY MEDICINE

## 2024-10-22 NOTE — PROGRESS NOTES
HPI:    Patient ID: Pro Nur is a 84 year old male who presents for f/u.    HPI  DM:   Taking metformin 500mg daily since last visit.   Little constipation, but not too bad. Otherwise tolerating well.   Is generally more active during the summer. No other lifestyle changes.   Saw lena Maldonado. Last saw in 05/2024.     Past Medical History:    BPH (benign prostatic hyperplasia)    CAD (coronary artery disease)    Essential hypertension    NAJMA (obstructive sleep apnea)    Other hyperlipidemia    Other specified hypothyroidism    Seasonal allergic rhinitis    SVT (supraventricular tachycardia) ([facility])    Type 2 diabetes mellitus without complication, without long-term current use of insulin ([facility])        Current Outpatient Medications   Medication Sig Dispense Refill    levothyroxine 75 MCG Oral Tab Take 1 tablet (75 mcg total) by mouth before breakfast. 90 tablet 3    losartan 100 MG Oral Tab Take 1 tablet (100 mg total) by mouth daily. 90 tablet 3    metFORMIN 500 MG Oral Tab Take 1 tablet (500 mg total) by mouth 2 (two) times daily with meals. 180 tablet 1    fluticasone propionate 50 MCG/ACT Nasal Suspension 2 sprays by Each Nare route daily. 1 each 2    finasteride 5 MG Oral Tab Take 1 tablet (5 mg total) by mouth daily.      atorvastatin 40 MG Oral Tab Take 1 tablet (40 mg total) by mouth daily. 90 tablet 3    Ascorbic Acid (VITAMIN C) 100 MG Oral Tab Take 1 tablet (100 mg total) by mouth daily. 90 tablet 1    aspirin EC 81 MG Oral Tab EC Take 1 tablet (81 mg total) by mouth daily. 90 tablet 1    B Complex-C-E-Zn (BEC/ZINC) Oral Tab Take 1 tablet by mouth daily. 90 tablet 1    Cholecalciferol (VITAMIN D3) 125 MCG (5000 UT) Oral Tab Take 1 tablet by mouth daily. 90 tablet 1    tamsulosin (FLOMAX) cap Take 1 capsule (0.4 mg total) by mouth daily. 90 capsule 1    Vitamin B-12 1000 MCG Oral Tab Take 1 tablet (1,000 mcg total) by mouth daily. 90 tablet 1        Allergies[1]    Review of Systems    Constitutional: Negative.    Respiratory: Negative.     Cardiovascular: Negative.    Gastrointestinal:  Positive for constipation.   Neurological: Negative.    All other systems reviewed and are negative.           /68   Pulse 72   Ht 5' 6\" (1.676 m)   Wt 168 lb (76.2 kg)   SpO2 98%   BMI 27.12 kg/m²     PHYSICAL EXAM:   Physical Exam  Constitutional:       General: He is not in acute distress.     Appearance: Normal appearance. He is not ill-appearing, toxic-appearing or diaphoretic.   HENT:      Head: Normocephalic and atraumatic.   Eyes:      Extraocular Movements: Extraocular movements intact.      Conjunctiva/sclera: Conjunctivae normal.   Cardiovascular:      Rate and Rhythm: Normal rate and regular rhythm.      Pulses: Normal pulses.      Heart sounds: Normal heart sounds. No murmur heard.     No friction rub. No gallop.   Pulmonary:      Effort: Pulmonary effort is normal. No respiratory distress.      Breath sounds: Normal breath sounds. No wheezing, rhonchi or rales.   Abdominal:      General: Abdomen is flat. Bowel sounds are normal. There is no distension.      Palpations: Abdomen is soft. There is no mass.      Tenderness: There is no abdominal tenderness. There is no guarding or rebound.      Hernia: No hernia is present.   Musculoskeletal:      Cervical back: Neck supple.      Right lower leg: No edema.      Left lower leg: No edema.   Skin:     General: Skin is warm and dry.      Capillary Refill: Capillary refill takes less than 2 seconds.   Neurological:      General: No focal deficit present.      Mental Status: He is alert.   Psychiatric:         Mood and Affect: Mood normal.         Behavior: Behavior normal.         Thought Content: Thought content normal.         Judgment: Judgment normal.             ASSESSMENT/PLAN:     Encounter Diagnoses   Name Primary?    Type 2 diabetes mellitus without complication, without long-term current use of insulin ([facility]) Yes    Constipation,  unspecified constipation type        1. Type 2 diabetes mellitus without complication, without long-term current use of insulin (HCC)  - POC Hemoglobin A1C    2. Constipation, unspecified constipation type     -POC A1c today: 7.05% (down from 7.5% since starting metformin)  -Continue current regimen: metformin 500mg daily  -Constipation mild and intermittent, and likely 2/2 metformin. Offered switching to ER formulation, but he defers for now. To call/message if becomes more bothersome/persistent.  -On statin & ARB.   -Will again reach out to [facility] for eye exam records from 05/2024.   -RTC In 02/2025 for f/u & repeat POC A1c.     Meds This Visit:  Requested Prescriptions      No prescriptions requested or ordered in this encounter       Imaging & Referrals:  INFLUENZA VAC HIGH DOSE PRSV FREE       Fausto Cervantes DO  ID#[id number]         [1] No Known Allergies

## 2024-11-19 ENCOUNTER — OFFICE VISIT (OUTPATIENT)
Dept: AUDIOLOGY | Facility: CLINIC | Age: 84
End: 2024-11-19

## 2024-11-19 DIAGNOSIS — H90.3 SENSORINEURAL HEARING LOSS (SNHL) OF BOTH EARS: Primary | ICD-10-CM

## 2024-11-19 PROCEDURE — 92557 COMPREHENSIVE HEARING TEST: CPT | Performed by: AUDIOLOGIST

## 2024-12-02 ENCOUNTER — TELEPHONE (OUTPATIENT)
Facility: CLINIC | Age: 84
End: 2024-12-02

## 2024-12-02 DIAGNOSIS — H90.3 SENSORINEURAL HEARING LOSS (SNHL) OF BOTH EARS: Primary | ICD-10-CM

## 2024-12-02 NOTE — TELEPHONE ENCOUNTER
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 Recommend ENT evaluation with Dr. Ricardo Mcdonough in [place] (P: [phone]). Please let patient know. Thank you.

## 2024-12-02 NOTE — TELEPHONE ENCOUNTER
Yes, okay to see the audiologist for hearing aids as it appears that is what the evaluation is for.

## 2024-12-02 NOTE — TELEPHONE ENCOUNTER
Verified name and  of patient.    Wife of patient (on release of information) was advised of Dr. Tejada's message- states she will discuss further with Dr. Cervantes as she has an upcoming appointment herself with Dr. Cervantes.

## 2024-12-02 NOTE — TELEPHONE ENCOUNTER
The patient and wife called back and they were informed.   Is the patient needed hearing aides?    And if so should he see the audiologist he saw for the hearing test?

## 2024-12-02 NOTE — TELEPHONE ENCOUNTER
Left message to call back. First attempt.      Referred to Provider Information:  Provider Address Phone   Ricardo Mcdonough MD [address] [place] [id number] [phone]

## 2025-01-28 ENCOUNTER — TELEPHONE (OUTPATIENT)
Facility: CLINIC | Age: 85
End: 2025-01-28

## 2025-01-30 ENCOUNTER — HOSPITAL ENCOUNTER (OUTPATIENT)
Age: 85
Discharge: HOME | End: 2025-01-30
Payer: MEDICARE

## 2025-01-30 VITALS
RESPIRATION RATE: 20 BRPM | SYSTOLIC BLOOD PRESSURE: 133 MMHG | HEART RATE: 70 BPM | OXYGEN SATURATION: 99 % | TEMPERATURE: 98 F | DIASTOLIC BLOOD PRESSURE: 71 MMHG

## 2025-01-30 DIAGNOSIS — J10.1 INFLUENZA A: Primary | ICD-10-CM

## 2025-01-30 LAB
POCT INFLUENZA A: POSITIVE
POCT INFLUENZA B: NEGATIVE
SARS-COV-2 RNA RESP QL NAA+PROBE: NOT DETECTED

## 2025-01-30 PROCEDURE — 87502 INFLUENZA DNA AMP PROBE: CPT | Performed by: NURSE PRACTITIONER

## 2025-01-30 PROCEDURE — 99214 OFFICE O/P EST MOD 30 MIN: CPT | Performed by: NURSE PRACTITIONER

## 2025-01-30 PROCEDURE — U0002 COVID-19 LAB TEST NON-CDC: HCPCS | Performed by: NURSE PRACTITIONER

## 2025-01-30 RX ORDER — BENZONATATE 200 MG/1
200 CAPSULE ORAL 3 TIMES DAILY PRN
Qty: 21 CAPSULE | Refills: 0 | Status: SHIPPED | OUTPATIENT
Start: 2025-01-30

## 2025-01-30 NOTE — DISCHARGE INSTRUCTIONS
Take the benzonatate as needed for the cough drink plenty of fluids warm tea with honey take over-the-counter Tylenol for any pain or discomfort eat as tolerated follow-up with your primary care provider in a week.  If you develop high fever chest pain shortness of breath worsening cough nausea vomiting diarrhea or any new or worsening symptoms go to the nearest emergency department

## 2025-01-30 NOTE — ED PROVIDER NOTES
Patient Seen in: [facility]      History     Chief Complaint   Patient presents with    Cough/URI     Stated Complaint: cough, , fever    Subjective:   HPI      This is an 84-year-old male with history of BPH CAD hypertension obstructive sleep apnea hypothyroidism diabetes presenting with a cough.  Patient states for about 3 days he has had a dry cough denies any fever chest pain shortness of breath nausea vomiting diarrhea or sore throat.  Patient states he was unable to get into see his doctor and he started taking over-the-counter cough medication but wanted to be evaluated.    Objective:     Past Medical History:    BPH (benign prostatic hyperplasia)    CAD (coronary artery disease)    Essential hypertension    NAJMA (obstructive sleep apnea)    Other hyperlipidemia    Other specified hypothyroidism    Seasonal allergic rhinitis    SVT (supraventricular tachycardia) ([facility])    Type 2 diabetes mellitus without complication, without long-term current use of insulin (HCC)              History reviewed. No pertinent surgical history.             Social History     Socioeconomic History    Marital status:    Tobacco Use    Smoking status: Never    Smokeless tobacco: Never   Vaping Use    Vaping status: Never Used   Substance and Sexual Activity    Alcohol use: Never    Drug use: Never    Sexual activity: Not Currently              Review of Systems    Positive for stated complaint: cough, , fever  Other systems are as noted in HPI.  Constitutional and vital signs reviewed.      All other systems reviewed and negative except as noted above.    Physical Exam     ED Triage Vitals [01/30/25 1458]   /71   Pulse 70   Resp 20   Temp 97.9 °F (36.6 °C)   Temp src Oral   SpO2 99 %   O2 Device None (Room air)       Current Vitals:   Vital Signs  BP: 133/71  Pulse: 70  Resp: 20  Temp: 97.9 °F (36.6 °C)  Temp src: Oral    Oxygen Therapy  SpO2: 99 %  O2 Device: None (Room air)        Physical Exam  Vitals  and nursing note reviewed.   Constitutional:       Appearance: Normal appearance.   HENT:      Right Ear: Tympanic membrane normal.      Left Ear: Tympanic membrane normal.      Nose: Nose normal. No congestion or rhinorrhea.      Mouth/Throat:      Mouth: Mucous membranes are moist.      Pharynx: Oropharynx is clear. No posterior oropharyngeal erythema.   Eyes:      Conjunctiva/sclera: Conjunctivae normal.   Cardiovascular:      Rate and Rhythm: Normal rate.   Pulmonary:      Effort: Pulmonary effort is normal. No respiratory distress.      Breath sounds: Normal breath sounds. No wheezing.      Comments: + cough  Musculoskeletal:         General: Normal range of motion.      Cervical back: Normal range of motion. No rigidity or tenderness.   Lymphadenopathy:      Cervical: No cervical adenopathy.   Skin:     General: Skin is warm.      Capillary Refill: Capillary refill takes less than 2 seconds.   Neurological:      General: No focal deficit present.      Mental Status: He is alert and oriented to person, place, and time.             ED Course     Labs Reviewed   POCT FLU TEST - Abnormal; Notable for the following components:       Result Value    POCT INFLUENZA A Positive (*)     All other components within normal limits    Narrative:     This assay is a rapid molecular in vitro test utilizing nucleic acid amplification of influenza A and B viral RNA.   RAPID SARS-COV-2 BY PCR - Normal            MDM           Medical Decision Making  84-year-old male well-appearing nontoxic afebrile no hypoxia distress with a dry cough for about 3 days.  DDx acute cough versus URI versus another respiratory or viral illness versus influenza versus COVID.  Patient's lungs are clear he is not hypoxic he is afebrile and no distress no clinical indication for labs or imaging but he will be swabbed for COVID and flu.    COVID-negative influenza A positive patient made aware of results discussed treatment with benzonatate for the  cough as he is out of the window for Tamiflu discussed Tylenol from over-the-counter hydrating well eating as tolerated discussed outpatient follow-up and ER precautions with any new or worsening symptoms.  Patient acknowledges understanding discharge instructions.    Problems Addressed:  Influenza A: acute illness or injury    Amount and/or Complexity of Data Reviewed  Labs: ordered. Decision-making details documented in ED Course.    Risk  OTC drugs.  Prescription drug management.        Disposition and Plan     Clinical Impression:  1. Influenza A         Disposition:  Discharge  1/30/2025  3:32 pm    Follow-up:  Fausto Cervantes DO  [address] [place] [id number]  [phone]    In 1 week            Medications Prescribed:  Discharge Medication List as of 1/30/2025  3:32 PM        START taking these medications    Details   benzonatate 200 MG Oral Cap Take 1 capsule (200 mg total) by mouth 3 (three) times daily as needed for cough., Normal, Disp-21 capsule, R-0                 Supplementary Documentation:

## 2025-04-29 ENCOUNTER — NURSE TRIAGE (OUTPATIENT)
Facility: CLINIC | Age: 85
End: 2025-04-29

## 2025-04-29 NOTE — TELEPHONE ENCOUNTER
Action Requested: Summary for Provider     []  Critical Lab, Recommendations Needed  [x] Need Additional Advice  []   FYI    []   Need Orders  [x] Need Medications Sent to Pharmacy  []  Other     SUMMARY:Pt requesting rx or Appt tomorrow-  1 SDA Slot available, was seen att  1/30/25- cough, neg, flu/COVID, tx -benzonate, continue to cough, thick clear phlegm, feels a tickle in the throat , staying hydrated, no otc meds , no fever     2) stated he received a call to repeat a hemoglobin test, advised no Order, last A1C was 7   Reason for call: Cough  Onset: since January                    Reason for Disposition   Cough has been present for > 3 weeks    Protocols used: Cough-A-OH     pt ambulated into triage, C/O "spitting up blood," denies cough. denies chest pain, SOB, RR even and unlabored no s/s of respiratory distress noted. endorses recent nose bleeds, no bleeding noted at this time. hx of endometriosis surgeries.

## 2025-04-30 ENCOUNTER — OFFICE VISIT (OUTPATIENT)
Facility: CLINIC | Age: 85
End: 2025-04-30
Payer: MEDICARE

## 2025-04-30 VITALS
HEIGHT: 66 IN | WEIGHT: 171 LBS | HEART RATE: 67 BPM | BODY MASS INDEX: 27.48 KG/M2 | OXYGEN SATURATION: 98 % | DIASTOLIC BLOOD PRESSURE: 80 MMHG | SYSTOLIC BLOOD PRESSURE: 148 MMHG

## 2025-04-30 DIAGNOSIS — J30.2 SEASONAL ALLERGIES: ICD-10-CM

## 2025-04-30 DIAGNOSIS — R05.3 CHRONIC COUGH: Primary | ICD-10-CM

## 2025-04-30 PROCEDURE — 99214 OFFICE O/P EST MOD 30 MIN: CPT | Performed by: FAMILY MEDICINE

## 2025-04-30 RX ORDER — FLUTICASONE PROPIONATE 50 MCG
2 SPRAY, SUSPENSION (ML) NASAL DAILY
Qty: 1 EACH | Refills: 2 | Status: SHIPPED | OUTPATIENT
Start: 2025-04-30

## 2025-04-30 NOTE — PROGRESS NOTES
HPI:    Patient ID: Pro Nur is a 84 year old male who presents for chronic cough.    HPI  Has had cough since January.   Had Influenza A at that time. Other symptoms have resolved.   Cough comes and goes.   Has phlegm.   Feels tickle in his throat.   No chest pain/tightness or SOB.   No other symptoms.  Benzonatate helped.  Uses flonase intermittently and helps.  Has seasonal allergies that are bad this time of year.      Past Medical History[1]     Current Medications[2]     Allergies[3]    Review of Systems   See HPI. Otherwise negative.         /80   Pulse 67   Ht 5' 6\" (1.676 m)   Wt 171 lb (77.6 kg)   SpO2 98%   BMI 27.60 kg/m²     PHYSICAL EXAM:   Physical Exam  Constitutional:       General: He is not in acute distress.     Appearance: Normal appearance. He is well-developed. He is not ill-appearing, toxic-appearing or diaphoretic.   HENT:      Head: Normocephalic and atraumatic.      Right Ear: Tympanic membrane, ear canal and external ear normal.      Left Ear: Tympanic membrane, ear canal and external ear normal.      Nose: Nose normal.      Mouth/Throat:      Mouth: Mucous membranes are moist.      Pharynx: Oropharynx is clear. No oropharyngeal exudate or posterior oropharyngeal erythema.   Eyes:      Extraocular Movements: Extraocular movements intact.      Conjunctiva/sclera: Conjunctivae normal.   Cardiovascular:      Rate and Rhythm: Normal rate and regular rhythm.      Pulses: Normal pulses.      Heart sounds: Normal heart sounds. No murmur heard.     No friction rub. No gallop.   Pulmonary:      Effort: Pulmonary effort is normal. No respiratory distress.      Breath sounds: Normal breath sounds. No wheezing or rales.   Musculoskeletal:      Cervical back: Neck supple.      Right lower leg: No edema.      Left lower leg: No edema.   Skin:     General: Skin is warm and dry.      Capillary Refill: Capillary refill takes less than 2 seconds.   Neurological:      General: No focal  deficit present.      Mental Status: He is alert.   Psychiatric:         Mood and Affect: Mood normal.         Behavior: Behavior normal.         Thought Content: Thought content normal.         Judgment: Judgment normal.             ASSESSMENT/PLAN:     Encounter Diagnoses   Name Primary?    Chronic cough Yes    Seasonal allergies        1. Chronic cough    2. Seasonal allergies     -Reviewed IC note & results.  -Cough likely postviral versus acute on chronic AR.   -Resume flonase and encouraged to use daily.  -To update if cough persists over the next few weeks. May then consider CXR.     Meds This Visit:  Requested Prescriptions     Signed Prescriptions Disp Refills    fluticasone propionate 50 MCG/ACT Nasal Suspension 1 each 2     Si sprays by Each Nare route daily.       Imaging & Referrals:  None       Fausto Cervantes, DO  ID#[id number]         [1]   Past Medical History:   BPH (benign prostatic hyperplasia)    CAD (coronary artery disease)    Essential hypertension    NAJMA (obstructive sleep apnea)    Other hyperlipidemia    Other specified hypothyroidism    Seasonal allergic rhinitis    SVT (supraventricular tachycardia) (HCC)    Type 2 diabetes mellitus without complication, without long-term current use of insulin (HCC)   [2]   Current Outpatient Medications   Medication Sig Dispense Refill    fluticasone propionate 50 MCG/ACT Nasal Suspension 2 sprays by Each Nare route daily. 1 each 2    levothyroxine 75 MCG Oral Tab Take 1 tablet (75 mcg total) by mouth before breakfast. 90 tablet 3    losartan 100 MG Oral Tab Take 1 tablet (100 mg total) by mouth daily. 90 tablet 3    metFORMIN 500 MG Oral Tab Take 1 tablet (500 mg total) by mouth 2 (two) times daily with meals. 180 tablet 1    finasteride 5 MG Oral Tab Take 1 tablet (5 mg total) by mouth daily.      atorvastatin 40 MG Oral Tab Take 1 tablet (40 mg total) by mouth daily. 90 tablet 3    Ascorbic Acid (VITAMIN C) 100 MG Oral Tab Take 1 tablet (100 mg total)  by mouth daily. 90 tablet 1    aspirin EC 81 MG Oral Tab EC Take 1 tablet (81 mg total) by mouth daily. 90 tablet 1    B Complex-C-E-Zn (BEC/ZINC) Oral Tab Take 1 tablet by mouth daily. 90 tablet 1    Cholecalciferol (VITAMIN D3) 125 MCG (5000 UT) Oral Tab Take 1 tablet by mouth daily. 90 tablet 1    tamsulosin (FLOMAX) cap Take 1 capsule (0.4 mg total) by mouth daily. 90 capsule 1    Vitamin B-12 1000 MCG Oral Tab Take 1 tablet (1,000 mcg total) by mouth daily. 90 tablet 1   [3] No Known Allergies

## 2025-05-01 DIAGNOSIS — E55.9 VITAMIN D DEFICIENCY: ICD-10-CM

## 2025-05-01 DIAGNOSIS — E11.9 TYPE 2 DIABETES MELLITUS WITHOUT COMPLICATION, WITHOUT LONG-TERM CURRENT USE OF INSULIN (HCC): ICD-10-CM

## 2025-05-01 DIAGNOSIS — Z00.00 PHYSICAL EXAM, ANNUAL: Primary | ICD-10-CM

## 2025-05-09 ENCOUNTER — NURSE TRIAGE (OUTPATIENT)
Facility: CLINIC | Age: 85
End: 2025-05-09

## 2025-05-09 ENCOUNTER — HOSPITAL ENCOUNTER (OUTPATIENT)
Age: 85
Discharge: HOME | End: 2025-05-09
Payer: MEDICARE

## 2025-05-09 VITALS
TEMPERATURE: 98 F | OXYGEN SATURATION: 98 % | RESPIRATION RATE: 12 BRPM | SYSTOLIC BLOOD PRESSURE: 150 MMHG | DIASTOLIC BLOOD PRESSURE: 66 MMHG | HEART RATE: 64 BPM

## 2025-05-09 DIAGNOSIS — M54.30 ACUTE SCIATICA: Primary | ICD-10-CM

## 2025-05-09 PROCEDURE — 99214 OFFICE O/P EST MOD 30 MIN: CPT | Performed by: NURSE PRACTITIONER

## 2025-05-09 PROCEDURE — 96372 THER/PROPH/DIAG INJ SC/IM: CPT | Performed by: NURSE PRACTITIONER

## 2025-05-09 RX ORDER — TIZANIDINE 2 MG/1
2 TABLET ORAL EVERY 6 HOURS PRN
Qty: 15 TABLET | Refills: 0 | Status: SHIPPED | OUTPATIENT
Start: 2025-05-09 | End: 2025-05-09 | Stop reason: CLARIF

## 2025-05-09 RX ORDER — METHOCARBAMOL 500 MG/1
500 TABLET, FILM COATED ORAL 3 TIMES DAILY PRN
Qty: 15 TABLET | Refills: 0 | Status: SHIPPED | OUTPATIENT
Start: 2025-05-09

## 2025-05-09 RX ORDER — KETOROLAC TROMETHAMINE 30 MG/ML
30 INJECTION, SOLUTION INTRAMUSCULAR; INTRAVENOUS ONCE
Status: COMPLETED | OUTPATIENT
Start: 2025-05-09 | End: 2025-05-09

## 2025-05-09 RX ORDER — LIDOCAINE 4 G/G
1 PATCH TOPICAL EVERY 24 HOURS
Qty: 7 PATCH | Refills: 0 | Status: SHIPPED | OUTPATIENT
Start: 2025-05-09 | End: 2025-05-16

## 2025-05-09 NOTE — TELEPHONE ENCOUNTER
DR Cervantes ===FYI       Action Requested: Summary for Provider     []  Critical Lab, Recommendations Needed  [] Need Additional Advice  [x]   FYI    []   Need Orders  [] Need Medications Sent to Pharmacy  []  Other     SUMMARY: Wife (release of information ) called and reported that patient pulled the lower back muscle yesterday . Pain 9/10.Pain radiates down  to his left leg .  No available appointment in [place] [address] location.   Instructed to bring the patient to IMMEDIATE/Urgent Care now .  Wife states she will try to convince the patient .        Reason for call: Back Pain  Onset: 1 day         Reason for Disposition   SEVERE back pain (e.g., excruciating, unable to do any normal activities) and not improved after pain medicine and CARE ADVICE    Protocols used: Back Pain-A-OH

## 2025-05-09 NOTE — ED INITIAL ASSESSMENT (HPI)
Pt here with complaints of lower back pain , pt states he was lifting some items 1 day ago and felt a sharp pain across his lower back , pt states he has pain with movement

## 2025-05-09 NOTE — ED PROVIDER NOTES
Patient Seen in: [facility]      History     Chief Complaint   Patient presents with    Back Pain     Stated Complaint: LOWER BACK PAIN    Subjective:   HPI  Patient is an 84-year-old diabetic male.  He presents to immediate care center with concern for left-sided low back pain that started yesterday.  This is pain with which he is familiar.  He states he has had remote history of sciatica and this pain is the same.  Yesterday morning, he was moving heavy items in the basement.  He moved from there bathroom to brush his teeth.  When he leaned over the sink to brush his teeth, he felt acute pain in the back.  This has been persistent since then.  The pain does radiate down the left leg.  He has had no bowel or bladder dysfunction; no saddle anesthesia; no pain or swelling of scrotum; no urinary symptoms.        History of Present Illness               Objective:     Past Medical History:    BPH (benign prostatic hyperplasia)    CAD (coronary artery disease)    Essential hypertension    NAJMA (obstructive sleep apnea)    Other hyperlipidemia    Other specified hypothyroidism    Seasonal allergic rhinitis    SVT (supraventricular tachycardia) ([facility])    Type 2 diabetes mellitus without complication, without long-term current use of insulin ([facility])              History reviewed. No pertinent surgical history.             Social History     Socioeconomic History    Marital status:    Tobacco Use    Smoking status: Never    Smokeless tobacco: Never   Vaping Use    Vaping status: Never Used   Substance and Sexual Activity    Alcohol use: Never    Drug use: Never    Sexual activity: Not Currently              Review of Systems   Constitutional:  Negative for fever.   Respiratory:  Negative for shortness of breath.    Cardiovascular:  Negative for chest pain.   Gastrointestinal:  Negative for abdominal pain.   Genitourinary:  Negative for dysuria, frequency, hematuria, scrotal swelling and testicular pain.    Musculoskeletal:  Positive for back pain.   Neurological:  Negative for weakness and numbness.       Positive for stated complaint: LOWER BACK PAIN  Other systems are as noted in HPI.  Constitutional and vital signs reviewed.      All other systems reviewed and negative except as noted above.                  Physical Exam     ED Triage Vitals [05/09/25 1216]   /66   Pulse 64   Resp 12   Temp 97.9 °F (36.6 °C)   Temp src Oral   SpO2 98 %   O2 Device None (Room air)       Current Vitals:   Vital Signs  BP: 150/66  Pulse: 64  Resp: 12  Temp: 97.9 °F (36.6 °C)  Temp src: Oral    Oxygen Therapy  SpO2: 98 %  O2 Device: None (Room air)        Physical Exam  Vitals and nursing note reviewed.   Constitutional:       General: He is in acute distress (Patient appears uncomfortable; he prefers to not sit in the chair for examination as he states this causes worse pain.).      Appearance: He is not ill-appearing.   Pulmonary:      Effort: Pulmonary effort is normal. No respiratory distress.   Abdominal:      Tenderness: There is no abdominal tenderness. There is no right CVA tenderness or left CVA tenderness.   Musculoskeletal:      Cervical back: No tenderness.      Thoracic back: No tenderness.      Lumbar back: No tenderness.        Back:    Skin:     General: Skin is warm and dry.   Neurological:      Mental Status: He is alert and oriented to person, place, and time.   Psychiatric:         Behavior: Behavior normal.           Physical Exam                ED Course   Labs Reviewed - No data to display  Medications   ketorolac (Toradol) 30 MG/ML injection 30 mg (has no administration in time range)          Results                           MDM      Patient was advised that we have to proceed cautiously with pain management given his history of diabetes and advanced age.  He has been using over-the-counter acetaminophen at home without benefit.  Patient's last documented creatinine was 74 (July 19, 2024).  Patient  was advised we will give him a single dose of Toradol injection now to hopefully break his cycle of pain.  He was given small quantity of muscle relaxant with the warning that this may cause drowsiness.  He will not drive or take alcohol while taking these medications and will take fall precautions at home.  Additionally, he will use lidocaine patches as directed.    Patient states he has had good benefit in the past with physical therapy similar pain.  He was encouraged to reach out to his primary care provider in 3 days for close follow-up if he has not had marked improvement to discuss treatment strategies.  He states understanding and agrees with plan.            Medical Decision Making  Differential diagnoses considered today include, but not exclusive to: Cauda equina syndrome, acute abdominal infection, pyelonephritis, nephrolithiasis, spinal bony injury, herniated disc, musculoskeletal strain.      Problems Addressed:  Acute sciatica: chronic illness or injury with exacerbation, progression, or side effects of treatment    Amount and/or Complexity of Data Reviewed  External Data Reviewed: labs.    Risk  OTC drugs.  Prescription drug management.        Disposition and Plan     Clinical Impression:  1. Acute sciatica         Disposition:  Discharge  5/9/2025 12:42 pm    Follow-up:  Fausto Cervantes DO  [address] [place] [address] [phone]    Call in 3 days  As needed          Medications Prescribed:  Current Discharge Medication List        START taking these medications    Details   lidocaine 4 % External Patch Place 1 patch onto the skin daily for 7 days.  Qty: 7 patch, Refills: 0      methocarbamol 500 MG Oral Tab Take 1 tablet (500 mg total) by mouth 3 (three) times daily as needed (for back pain).  Qty: 15 tablet, Refills: 0             Supplementary Documentation:

## 2025-05-12 NOTE — TELEPHONE ENCOUNTER
Spouse Mono(on HIPAA) indicated that patient went to urgent care on 5/9/2025 for low back pain. States he was prescribed lidocaine patches and tizanidine 2mg every 6 hours. [facility] medication is not helping and still having the low back pain. [facility] pain level is still 10/10. Wanted to see Dr Cervantes but no appointments available till 5/19/2025. Appointment made for 5/14/2025 at 3pm with Dr Tejada in [place].    Are you able to add patient to your schedule sooner?

## 2025-05-13 ENCOUNTER — OFFICE VISIT (OUTPATIENT)
Facility: CLINIC | Age: 85
End: 2025-05-13
Payer: MEDICARE

## 2025-05-13 VITALS
WEIGHT: 172 LBS | OXYGEN SATURATION: 98 % | DIASTOLIC BLOOD PRESSURE: 74 MMHG | SYSTOLIC BLOOD PRESSURE: 116 MMHG | HEIGHT: 66 IN | BODY MASS INDEX: 27.64 KG/M2 | HEART RATE: 64 BPM

## 2025-05-13 DIAGNOSIS — M54.42 CHRONIC LEFT-SIDED LOW BACK PAIN WITH LEFT-SIDED SCIATICA: Primary | ICD-10-CM

## 2025-05-13 DIAGNOSIS — G89.29 CHRONIC LEFT-SIDED LOW BACK PAIN WITH LEFT-SIDED SCIATICA: Primary | ICD-10-CM

## 2025-05-13 PROCEDURE — 99214 OFFICE O/P EST MOD 30 MIN: CPT | Performed by: FAMILY MEDICINE

## 2025-05-13 RX ORDER — TIZANIDINE 2 MG/1
TABLET ORAL
COMMUNITY
Start: 2025-05-09

## 2025-05-13 RX ORDER — METHYLPREDNISOLONE 4 MG/1
TABLET ORAL
Qty: 1 EACH | Refills: 0 | Status: SHIPPED | OUTPATIENT
Start: 2025-05-13

## 2025-05-13 NOTE — PROGRESS NOTES
HPI:    Patient ID: Pro Nur is a 84 year old male who presents for back pain.    HPI  Was seen in  on 5/9/25 for left LBP x1 day. Discharged home with lidocaine patches & tizanidine.     Pain started when he was leaning over his bathroom sink.   Pain radiates down left leg.   No numbness or weakness.   No other associated symptoms.   Had medrol dose pack & PT in 2023 and both worked well.   Interested in repeating PT.     Past Medical History[1]     Current Medications[2]     Allergies[3]    Review of Systems   Constitutional: Negative.    Respiratory: Negative.     Cardiovascular: Negative.    Gastrointestinal: Negative.    Genitourinary: Negative.    Musculoskeletal:  Positive for back pain.   Neurological: Negative.  Negative for weakness and numbness.   All other systems reviewed and are negative.           /74   Pulse 64   Ht 5' 6\" (1.676 m)   Wt 172 lb (78 kg)   SpO2 98%   BMI 27.76 kg/m²     PHYSICAL EXAM:   Physical Exam  Constitutional:       General: He is not in acute distress.     Appearance: Normal appearance. He is not ill-appearing, toxic-appearing or diaphoretic.   HENT:      Head: Normocephalic and atraumatic.   Eyes:      Extraocular Movements: Extraocular movements intact.      Conjunctiva/sclera: Conjunctivae normal.   Cardiovascular:      Rate and Rhythm: Normal rate and regular rhythm.      Pulses: Normal pulses.      Heart sounds: Normal heart sounds. No murmur heard.     No friction rub. No gallop.   Pulmonary:      Effort: Pulmonary effort is normal. No respiratory distress.      Breath sounds: Normal breath sounds. No wheezing, rhonchi or rales.   Musculoskeletal:      Cervical back: Neck supple.      Lumbar back: No swelling, edema, deformity, signs of trauma, spasms, tenderness or bony tenderness. Decreased range of motion. Negative right straight leg raise test and negative left straight leg raise test. No scoliosis.      Right lower leg: No edema.      Left lower  leg: No edema.   Skin:     General: Skin is warm and dry.      Capillary Refill: Capillary refill takes less than 2 seconds.   Neurological:      General: No focal deficit present.      Mental Status: He is alert.      Sensory: Sensation is intact. No sensory deficit.      Motor: Motor function is intact. No weakness.      Coordination: Coordination is intact.      Gait: Gait is intact. Gait normal.      Deep Tendon Reflexes: Reflexes are normal and symmetric.   Psychiatric:         Mood and Affect: Mood normal.         Behavior: Behavior normal.         Thought Content: Thought content normal.         Judgment: Judgment normal.             ASSESSMENT/PLAN:     Encounter Diagnosis   Name Primary?    Chronic left-sided low back pain with left-sided sciatica Yes       1. Chronic left-sided low back pain with left-sided sciatica  - Physical Therapy Referral - External     -Acute on chronic lumbar radiculopathy.   -Reviewed UC note.   -Reviewed conservative management in detail including heating pad & tylenol prn.   -Recommend repeat trial of medrol dose pack and course of PT. Referral generated.   -Plan for updating imaging if pains persist/recur.     Meds This Visit:  Requested Prescriptions     Signed Prescriptions Disp Refills    methylPREDNISolone (MEDROL) 4 MG Oral Tablet Therapy Pack 1 each 0     Sig: As directed.       Imaging & Referrals:  PHYSICAL THERAPY EXTERNAL       Fausto Cervantes DO  ID#[id number]       [1]   Past Medical History:   BPH (benign prostatic hyperplasia)    CAD (coronary artery disease)    Essential hypertension    NAJMA (obstructive sleep apnea)    Other hyperlipidemia    Other specified hypothyroidism    Seasonal allergic rhinitis    SVT (supraventricular tachycardia) ([facility])    Type 2 diabetes mellitus without complication, without long-term current use of insulin ([facility])   [2]   Current Outpatient Medications   Medication Sig Dispense Refill    tiZANidine 2 MG Oral Tab Take 1 tablet (2 mg total) by  mouth every 6 (six) hours as needed (muscle cramping).      methylPREDNISolone (MEDROL) 4 MG Oral Tablet Therapy Pack As directed. 1 each 0    lidocaine 4 % External Patch Place 1 patch onto the skin daily for 7 days. 7 patch 0    methocarbamol 500 MG Oral Tab Take 1 tablet (500 mg total) by mouth 3 (three) times daily as needed (for back pain). 15 tablet 0    fluticasone propionate 50 MCG/ACT Nasal Suspension 2 sprays by Each Nare route daily. 1 each 2    levothyroxine 75 MCG Oral Tab Take 1 tablet (75 mcg total) by mouth before breakfast. 90 tablet 3    losartan 100 MG Oral Tab Take 1 tablet (100 mg total) by mouth daily. 90 tablet 3    metFORMIN 500 MG Oral Tab Take 1 tablet (500 mg total) by mouth 2 (two) times daily with meals. 180 tablet 1    finasteride 5 MG Oral Tab Take 1 tablet (5 mg total) by mouth in the morning.      atorvastatin 40 MG Oral Tab Take 1 tablet (40 mg total) by mouth daily. 90 tablet 3    Ascorbic Acid (VITAMIN C) 100 MG Oral Tab Take 1 tablet (100 mg total) by mouth daily. 90 tablet 1    aspirin EC 81 MG Oral Tab EC Take 1 tablet (81 mg total) by mouth daily. 90 tablet 1    B Complex-C-E-Zn (BEC/ZINC) Oral Tab Take 1 tablet by mouth daily. 90 tablet 1    Cholecalciferol (VITAMIN D3) 125 MCG (5000 UT) Oral Tab Take 1 tablet by mouth daily. 90 tablet 1    tamsulosin (FLOMAX) cap Take 1 capsule (0.4 mg total) by mouth daily. 90 capsule 1    Vitamin B-12 1000 MCG Oral Tab Take 1 tablet (1,000 mcg total) by mouth daily. 90 tablet 1   [3] No Known Allergies

## 2025-05-14 ENCOUNTER — TELEPHONE (OUTPATIENT)
Facility: CLINIC | Age: 85
End: 2025-05-14

## 2025-06-17 ENCOUNTER — HOSPITAL ENCOUNTER (OUTPATIENT)
Age: 85
Discharge: HOME | End: 2025-06-17
Payer: MEDICARE

## 2025-06-17 ENCOUNTER — APPOINTMENT (OUTPATIENT)
Dept: GENERAL RADIOLOGY | Age: 85
End: 2025-06-17
Attending: PHYSICIAN ASSISTANT
Payer: MEDICARE

## 2025-06-17 VITALS
RESPIRATION RATE: 20 BRPM | TEMPERATURE: 99 F | HEART RATE: 65 BPM | OXYGEN SATURATION: 99 % | SYSTOLIC BLOOD PRESSURE: 129 MMHG | DIASTOLIC BLOOD PRESSURE: 69 MMHG

## 2025-06-17 DIAGNOSIS — Z88.9 HISTORY OF SEASONAL ALLERGIES: ICD-10-CM

## 2025-06-17 DIAGNOSIS — J30.9 CHRONIC ALLERGIC RHINITIS: ICD-10-CM

## 2025-06-17 DIAGNOSIS — R05.3 CHRONIC COUGH: ICD-10-CM

## 2025-06-17 DIAGNOSIS — R05.8 COUGH WITH EXPOSURE TO COVID-19 VIRUS: Primary | ICD-10-CM

## 2025-06-17 DIAGNOSIS — Z20.822 COUGH WITH EXPOSURE TO COVID-19 VIRUS: Primary | ICD-10-CM

## 2025-06-17 LAB — SARS-COV-2 RNA RESP QL NAA+PROBE: NOT DETECTED

## 2025-06-17 PROCEDURE — 71046 X-RAY EXAM CHEST 2 VIEWS: CPT | Performed by: PHYSICIAN ASSISTANT

## 2025-06-17 PROCEDURE — 99214 OFFICE O/P EST MOD 30 MIN: CPT | Performed by: PHYSICIAN ASSISTANT

## 2025-06-17 PROCEDURE — U0002 COVID-19 LAB TEST NON-CDC: HCPCS | Performed by: PHYSICIAN ASSISTANT

## 2025-06-17 RX ORDER — BENZONATATE 200 MG/1
200 CAPSULE ORAL 3 TIMES DAILY PRN
Qty: 30 CAPSULE | Refills: 0 | Status: SHIPPED | OUTPATIENT
Start: 2025-06-17

## 2025-06-17 RX ORDER — LORATADINE 10 MG/1
10 TABLET ORAL DAILY
Qty: 30 TABLET | Refills: 0 | Status: SHIPPED | OUTPATIENT
Start: 2025-06-17 | End: 2025-07-17

## 2025-06-17 NOTE — ED INITIAL ASSESSMENT (HPI)
Pt came in due to exposure to covid a week ago and having a worsening cough. Pt has easy non labored respirations.

## 2025-06-17 NOTE — ED PROVIDER NOTES
Patient Seen in: [facility]        History  Chief Complaint   Patient presents with    Cough/URI     Stated Complaint: cough, runny nose    Subjective:   HPI            Patient is a 84-year-old male with hypertension, hyperlipidemia, coronary disease, type 2 diabetes, seasonal allergic rhinitis, chronic cough, NAJMA, accompanied by wife, both presenting to immediate care for COVID testing.  Close COVID exposure from son 1 week ago.  Patient with chronic cough.  has been having worsening cough over the last week.  Has seen primary doctor in the past for chronic cough and suspected chronic allergic rhinitis/seasonal allergies.  States was given Flonase nasal spray for nasal congestion which has been taking without improvement.  Recently started taking DayQuil and NyQuil for symptoms with no improvement.  Wife bought over-the-counter cough medication from [employer] in which she has been taking with improvement.  Does endorse past improvement with Tessalon Perles.  He denies any fevers.  No chest pain or shortness of breath.  No weakness or confusion.  Here for further evaluation and COVID testing.      Objective:     Past Medical History:    BPH (benign prostatic hyperplasia)    CAD (coronary artery disease)    Essential hypertension    NAJMA (obstructive sleep apnea)    Other hyperlipidemia    Other specified hypothyroidism    Seasonal allergic rhinitis    SVT (supraventricular tachycardia) ([facility])    Type 2 diabetes mellitus without complication, without long-term current use of insulin ([facility])              No pertinent past surgical history.              No pertinent social history.            Review of Systems   Constitutional:  Negative for chills and fever.   HENT:  Positive for congestion and postnasal drip. Negative for ear pain, facial swelling, sore throat, trouble swallowing and voice change.    Respiratory:  Positive for cough. Negative for shortness of breath.    Cardiovascular:  Negative for  chest pain.   Gastrointestinal:  Negative for abdominal pain, nausea and vomiting.   Skin:  Negative for rash.   Allergic/Immunologic: Positive for environmental allergies.   Neurological:  Negative for dizziness, light-headedness and headaches.   Psychiatric/Behavioral:  Negative for confusion.        Positive for stated complaint: cough, runny nose  Other systems are as noted in HPI.  Constitutional and vital signs reviewed.      All other systems reviewed and negative except as noted above.                  Physical Exam    ED Triage Vitals [06/17/25 1455]   /69   Pulse 65   Resp 20   Temp 98.5 °F (36.9 °C)   Temp src Oral   SpO2 99 %   O2 Device None (Room air)       Current Vitals:   Vital Signs  BP: 129/69  Pulse: 65  Resp: 20  Temp: 98.5 °F (36.9 °C)  Temp src: Oral    Oxygen Therapy  SpO2: 99 %  O2 Device: None (Room air)            Physical Exam  Vitals and nursing note reviewed.   Constitutional:       General: He is not in acute distress.     Appearance: Normal appearance. He is not ill-appearing, toxic-appearing or diaphoretic.      Comments: Alert, well-appearing, no acute distress.  Accompanied by wife.   HENT:      Head: Normocephalic and atraumatic.      Right Ear: Tympanic membrane normal.      Left Ear: Tympanic membrane normal.      Nose:      Comments: Clear nasal drainage.     Mouth/Throat:      Mouth: Mucous membranes are moist.      Comments: Postnasal drip.  Eyes:      Conjunctiva/sclera: Conjunctivae normal.   Cardiovascular:      Rate and Rhythm: Normal rate and regular rhythm.      Pulses: Normal pulses.   Pulmonary:      Effort: Pulmonary effort is normal.      Breath sounds: No wheezing, rhonchi or rales.      Comments: Lungs diminished bibasilar without rales or wheezing.  Musculoskeletal:         General: No deformity. Normal range of motion.      Cervical back: Normal range of motion and neck supple. No rigidity.   Skin:     Findings: Bruising: .this.   Neurological:       General: No focal deficit present.      Mental Status: He is alert and oriented to person, place, and time.      Motor: No weakness.      Gait: Gait normal.   Psychiatric:         Mood and Affect: Mood normal.         Behavior: Behavior normal.             ED Course  Labs Reviewed   RAPID SARS-COV-2 BY PCR - Normal     Results for orders placed or performed during the hospital encounter of 06/17/25   Rapid SARS-CoV-2 by PCR    Collection Time: 06/17/25  3:02 PM    Specimen: Nares; Other   Result Value Ref Range    Rapid SARS-CoV-2 by PCR Not Detected Not Detected     XR CHEST PA + LAT CHEST (CPT=71046)   Final Result   PROCEDURE: XR CHEST PA + LAT CHEST (CPT=71046)       COMPARISON: None.       INDICATIONS: Worsening cough for over 1 week. Exposure to COVID.       TECHNIQUE:   Two views.         FINDINGS:    CARDIAC/VASC: Mild aortic atherosclerotic calcifications and tortuosity.     Otherwise unremarkable cardiac silhouette.   MEDIAST/MARGIE: No visible mass or adenopathy.    LUNGS/PLEURA: Mild scattered pulmonary parenchymal scarring.  No specific    evidence of acute pneumonia or edema.  No appreciable pleural effusions.   BONES: No fracture or visible bony lesion.    OTHER: Negative.                     =====   CONCLUSION:        No acute cardiopulmonary abnormality evident.               Dictated by (CST): Roddy Bolton MD on 6/17/2025 at 3:47 PM        Finalized by (CST): Roddy Bolton MD on 6/17/2025 at 3:48 PM                      MDM    Patient is 84-year-old Male, accompanied by wife, presenting to immediate care for evaluation of worsening cough for the last week.  Recent close COVID exposure from son.  Both him and wife are sick.  Both have chronic cough with suspected chronic allergic rhinitis and seasonal allergies.  Patient is well-appearing.  Afebrile.  Nontachycardic not hypoxic.  Exam notable for clear nasal congestion/rhinorrhea with postnasal drip.  Lungs are diminished bibasilar without rales or  wheezing.  Not coughing during exam/history.  Further evaluation with COVID PCR testing is negative.  Further evaluation of acute on chronic worsening cough with x-ray imaging.  Chest x-ray negative for consolidation, hypervolemia, pneumonia, pleural effusion, etc.  Suspect patient's symptoms secondary to acute viral illness with underlying chronic cough secondary to allergic rhinitis.  He does endorse past improvement with Tessalon.  Will prescribe Tessalon Perles.  May take over-the-counter cough medication.  Recommendation for continuing his Flonase and will prescribe loratadine oral antihistamine for chronic allergic rhinitis.  Further PCP/ENT follow-up.  Stable for discharge    Medical Decision Making      Disposition and Plan     Clinical Impression:  1. Cough with exposure to COVID-19 virus    2. Chronic cough    3. History of seasonal allergies    4. Chronic allergic rhinitis         Disposition:  Discharge  6/17/2025  3:53 pm    Follow-up:  Fausto Cervantes DO  [address] [address]  [phone]          Ricardo Mcdonough MD  [address] [place] [id number] [phone]      ENT (Ear Nose and Throat ) Doctor, As needed          Medications Prescribed:  Current Discharge Medication List        START taking these medications    Details   benzonatate 200 MG Oral Cap Take 1 capsule (200 mg total) by mouth 3 (three) times daily as needed for cough.  Qty: 30 capsule, Refills: 0      loratadine 10 MG Oral Tab Take 1 tablet (10 mg total) by mouth daily.  Qty: 30 tablet, Refills: 0                   Supplementary Documentation:

## 2025-06-26 NOTE — TELEPHONE ENCOUNTER
Please review.  Protocol failed / Has no protocol.    Patient has labs outstanding from last office visit 5/1/25      Requested Prescriptions   Pending Prescriptions Disp Refills    METFORMIN 500 MG Oral Tab [Pharmacy Med Name: Metformin Hydrochloride 500 Mg Tab [place]] 180 tablet 0     Sig: TAKE ONE TABLET BY MOUTH TWICE DAILY WITH MEALS  **Please complete labs for further refills       Diabetes Medication Protocol Failed - 6/26/2025  5:34 PM        Failed - Last A1C < 7.5 and within past 6 months     Lab Results   Component Value Date    A1C 7.0 (A) 10/22/2024           Passed - In person appointment or virtual visit in the past 6 mos or appointment in next 3 mos        Passed - Microalbumin procedure in past 12 months or taking ACE/ARB        Passed - EGFRCR or GFRNAA > 50        Passed - GFR in the past 12 months        Passed - Medication is active on med list

## 2025-07-17 ENCOUNTER — LAB ENCOUNTER (OUTPATIENT)
Dept: LAB | Age: 85
End: 2025-07-17
Attending: FAMILY MEDICINE
Payer: MEDICARE

## 2025-07-17 DIAGNOSIS — Z00.00 PHYSICAL EXAM, ANNUAL: ICD-10-CM

## 2025-07-17 DIAGNOSIS — E11.9 TYPE 2 DIABETES MELLITUS WITHOUT COMPLICATION, WITHOUT LONG-TERM CURRENT USE OF INSULIN (HCC): ICD-10-CM

## 2025-07-17 DIAGNOSIS — E55.9 VITAMIN D DEFICIENCY: ICD-10-CM

## 2025-07-17 LAB
ALBUMIN SERPL-MCNC: 4.5 G/DL (ref 3.2–4.8)
ALBUMIN/GLOB SERPL: 1.8 {RATIO} (ref 1–2)
ALP LIVER SERPL-CCNC: 66 U/L (ref 45–117)
ALT SERPL-CCNC: 23 U/L (ref 10–49)
ANION GAP SERPL CALC-SCNC: 10 MMOL/L (ref 0–18)
AST SERPL-CCNC: 20 U/L (ref ?–34)
BASOPHILS # BLD AUTO: 0.04 X10(3) UL (ref 0–0.2)
BASOPHILS NFR BLD AUTO: 0.6 %
BILIRUB SERPL-MCNC: 1.4 MG/DL (ref 0.2–1.1)
BUN BLD-MCNC: 14 MG/DL (ref 9–23)
BUN/CREAT SERPL: 12.7 (ref 10–20)
CALCIUM BLD-MCNC: 9.6 MG/DL (ref 8.7–10.4)
CHLORIDE SERPL-SCNC: 106 MMOL/L (ref 98–112)
CHOLEST SERPL-MCNC: 144 MG/DL (ref ?–200)
CO2 SERPL-SCNC: 25 MMOL/L (ref 21–32)
CREAT BLD-MCNC: 1.1 MG/DL (ref 0.7–1.3)
CREAT UR-SCNC: 25.9 MG/DL
DEPRECATED RDW RBC AUTO: 45.2 FL (ref 35.1–46.3)
EGFRCR SERPLBLD CKD-EPI 2021: 67 ML/MIN/1.73M2 (ref 60–?)
EOSINOPHIL # BLD AUTO: 0.12 X10(3) UL (ref 0–0.7)
EOSINOPHIL NFR BLD AUTO: 1.8 %
ERYTHROCYTE [DISTWIDTH] IN BLOOD BY AUTOMATED COUNT: 13.9 % (ref 11–15)
EST. AVERAGE GLUCOSE BLD GHB EST-MCNC: 174 MG/DL (ref 68–126)
FASTING PATIENT LIPID ANSWER: NO
FASTING STATUS PATIENT QL REPORTED: NO
GLOBULIN PLAS-MCNC: 2.5 G/DL (ref 2–3.5)
GLUCOSE BLD-MCNC: 140 MG/DL (ref 70–99)
HBA1C MFR BLD: 7.7 % (ref ?–5.7)
HCT VFR BLD AUTO: 39.8 % (ref 39–53)
HDLC SERPL-MCNC: 51 MG/DL (ref 40–59)
HGB BLD-MCNC: 12.9 G/DL (ref 13–17.5)
IMM GRANULOCYTES # BLD AUTO: 0.01 X10(3) UL (ref 0–1)
IMM GRANULOCYTES NFR BLD: 0.1 %
LDLC SERPL CALC-MCNC: 64 MG/DL (ref ?–100)
LYMPHOCYTES # BLD AUTO: 2.48 X10(3) UL (ref 1–4)
LYMPHOCYTES NFR BLD AUTO: 36.2 %
MCH RBC QN AUTO: 28.8 PG (ref 26–34)
MCHC RBC AUTO-ENTMCNC: 32.4 G/DL (ref 31–37)
MCV RBC AUTO: 88.8 FL (ref 80–100)
MICROALBUMIN UR-MCNC: <0.3 MG/DL
MONOCYTES # BLD AUTO: 0.57 X10(3) UL (ref 0.1–1)
MONOCYTES NFR BLD AUTO: 8.3 %
NEUTROPHILS # BLD AUTO: 3.63 X10 (3) UL (ref 1.5–7.7)
NEUTROPHILS # BLD AUTO: 3.63 X10(3) UL (ref 1.5–7.7)
NEUTROPHILS NFR BLD AUTO: 53 %
NONHDLC SERPL-MCNC: 93 MG/DL (ref ?–130)
OSMOLALITY SERPL CALC.SUM OF ELEC: 295 MOSM/KG (ref 275–295)
PLATELET # BLD AUTO: 284 10(3)UL (ref 150–450)
POTASSIUM SERPL-SCNC: 3.9 MMOL/L (ref 3.5–5.1)
PROT SERPL-MCNC: 7 G/DL (ref 5.7–8.2)
RBC # BLD AUTO: 4.48 X10(6)UL (ref 3.8–5.8)
SODIUM SERPL-SCNC: 141 MMOL/L (ref 136–145)
TRIGL SERPL-MCNC: 171 MG/DL (ref 30–149)
TSI SER-ACNC: 1.82 UIU/ML (ref 0.55–4.78)
VIT D+METAB SERPL-MCNC: 24.4 NG/ML (ref 30–100)
VLDLC SERPL CALC-MCNC: 26 MG/DL (ref 0–30)
WBC # BLD AUTO: 6.9 X10(3) UL (ref 4–11)

## 2025-07-17 PROCEDURE — 82043 UR ALBUMIN QUANTITATIVE: CPT

## 2025-07-17 PROCEDURE — 85025 COMPLETE CBC W/AUTO DIFF WBC: CPT

## 2025-07-17 PROCEDURE — 84443 ASSAY THYROID STIM HORMONE: CPT

## 2025-07-17 PROCEDURE — 36415 COLL VENOUS BLD VENIPUNCTURE: CPT

## 2025-07-17 PROCEDURE — 82570 ASSAY OF URINE CREATININE: CPT

## 2025-07-17 PROCEDURE — 80061 LIPID PANEL: CPT

## 2025-07-17 PROCEDURE — 80053 COMPREHEN METABOLIC PANEL: CPT

## 2025-07-17 PROCEDURE — 83036 HEMOGLOBIN GLYCOSYLATED A1C: CPT

## 2025-07-17 PROCEDURE — 82306 VITAMIN D 25 HYDROXY: CPT

## 2025-07-28 ENCOUNTER — OFFICE VISIT (OUTPATIENT)
Facility: CLINIC | Age: 85
End: 2025-07-28
Payer: MEDICARE

## 2025-07-28 VITALS
WEIGHT: 163 LBS | DIASTOLIC BLOOD PRESSURE: 64 MMHG | OXYGEN SATURATION: 98 % | SYSTOLIC BLOOD PRESSURE: 118 MMHG | HEIGHT: 66 IN | HEART RATE: 61 BPM | BODY MASS INDEX: 26.2 KG/M2

## 2025-07-28 DIAGNOSIS — G89.29 CHRONIC LEFT-SIDED LOW BACK PAIN WITH LEFT-SIDED SCIATICA: ICD-10-CM

## 2025-07-28 DIAGNOSIS — E78.49 OTHER HYPERLIPIDEMIA: ICD-10-CM

## 2025-07-28 DIAGNOSIS — Z00.00 ENCOUNTER FOR ANNUAL HEALTH EXAMINATION: Primary | ICD-10-CM

## 2025-07-28 DIAGNOSIS — N40.1 BENIGN PROSTATIC HYPERPLASIA WITH NOCTURIA: ICD-10-CM

## 2025-07-28 DIAGNOSIS — I47.10 PSVT (PAROXYSMAL SUPRAVENTRICULAR TACHYCARDIA) (HCC): ICD-10-CM

## 2025-07-28 DIAGNOSIS — E03.8 OTHER SPECIFIED HYPOTHYROIDISM: ICD-10-CM

## 2025-07-28 DIAGNOSIS — I10 ESSENTIAL HYPERTENSION: ICD-10-CM

## 2025-07-28 DIAGNOSIS — I25.10 CORONARY ARTERY DISEASE INVOLVING NATIVE CORONARY ARTERY OF NATIVE HEART WITHOUT ANGINA PECTORIS: ICD-10-CM

## 2025-07-28 DIAGNOSIS — E11.9 TYPE 2 DIABETES MELLITUS WITHOUT COMPLICATION, WITHOUT LONG-TERM CURRENT USE OF INSULIN (HCC): ICD-10-CM

## 2025-07-28 DIAGNOSIS — E55.9 VITAMIN D DEFICIENCY: ICD-10-CM

## 2025-07-28 DIAGNOSIS — R35.1 BENIGN PROSTATIC HYPERPLASIA WITH NOCTURIA: ICD-10-CM

## 2025-07-28 DIAGNOSIS — M54.42 CHRONIC LEFT-SIDED LOW BACK PAIN WITH LEFT-SIDED SCIATICA: ICD-10-CM

## 2025-07-28 NOTE — PROGRESS NOTES
Subjective:   Pro Nur is a 85 year old male who presents for a Medicare Subsequent Annual Wellness visit (Pt already had Initial Annual Wellness) and scheduled follow up of multiple significant but stable problems.             Still seeing cardiology at . Sees yearly.      Sees urology regularly.      LBP: Completed PT and helped a lot. Still doing HEP.     DM: Taking metformin 500mg daily. Exercises regularly and going to the park. Eating well. Feels good and has more energy.   Saw ophtho Dr. Adeola Maldonado. Last saw in 05/2025.     Vit D defic: Taking supplement daily although misses days sometimes.      Last colonoscopy: About 9 years ago at [facility] with Dr. Ansari. This was normal. Was recommended to repeat in 10 years. Has had 4 colonoscopies thus far.   Last PSA level: Hx of BPH. Sees Dr. Virgen.   Immunizations: Covid: UTD, Shingles: Never (costs $200 but will consider). PPSV23: 10/2020, PCV20: Never       History/Other:   Fall Risk Assessment:   He has been screened for Falls and is low risk.      Cognitive Assessment:   Abnormal  What day of the week is this?: Correct  What month is it?: Incorrect  What year is it?: Correct  Recall \"Ball\": Correct  Recall \"Flag\": Incorrect  Recall \"Tree\": Correct      Functional Ability/Status:   Pro Nur has some abnormal functions as listed below:  He has Hearing problems based on screening of functional status.He has Vision problems based on screening of functional status. He has problems with Memory based on screening of functional status.       Depression Screening (PHQ):  PHQ-2 SCORE: 0  , done 7/28/2025             Advanced Directives:   He does have a Living Will but we do NOT have it on file in Epic.    He does have a POA but we do NOT have it on file in Epic.    Not discussed      Patient Active Problem List   Diagnosis    Seasonal allergic rhinitis    Other specified hypothyroidism    Essential hypertension    Other hyperlipidemia    Coronary  artery disease involving native coronary artery of native heart    NAJMA (obstructive sleep apnea)    Benign prostatic hyperplasia with nocturia    Vertigo    Indigestion    PSVT (paroxysmal supraventricular tachycardia) ([facility])    Type 2 diabetes mellitus without complication, without long-term current use of insulin ([facility])    Sensorineural hearing loss (SNHL) of both ears     Allergies:  He has no known allergies.    Current Medications:  Outpatient Medications Marked as Taking for the 7/28/25 encounter (Office Visit) with Fausto Cervantes,    Medication Sig    metFORMIN 500 MG Oral Tab Take 1 tablet (500 mg total) by mouth 2 (two) times daily with meals. **Please complete labs for further refills    methocarbamol 500 MG Oral Tab Take 1 tablet (500 mg total) by mouth 3 (three) times daily as needed (for back pain).    fluticasone propionate 50 MCG/ACT Nasal Suspension 2 sprays by Each Nare route daily.    levothyroxine 75 MCG Oral Tab Take 1 tablet (75 mcg total) by mouth before breakfast.    losartan 100 MG Oral Tab Take 1 tablet (100 mg total) by mouth daily.    finasteride 5 MG Oral Tab Take 1 tablet (5 mg total) by mouth in the morning.    atorvastatin 40 MG Oral Tab Take 1 tablet (40 mg total) by mouth daily.    Ascorbic Acid (VITAMIN C) 100 MG Oral Tab Take 1 tablet (100 mg total) by mouth daily.    aspirin EC 81 MG Oral Tab EC Take 1 tablet (81 mg total) by mouth daily.    B Complex-C-E-Zn (BEC/ZINC) Oral Tab Take 1 tablet by mouth daily.    Cholecalciferol (VITAMIN D3) 125 MCG (5000 UT) Oral Tab Take 1 tablet by mouth daily.    tamsulosin (FLOMAX) cap Take 1 capsule (0.4 mg total) by mouth daily.    Vitamin B-12 1000 MCG Oral Tab Take 1 tablet (1,000 mcg total) by mouth daily.       Medical History:  He  has a past medical history of BPH (benign prostatic hyperplasia), CAD (coronary artery disease), Essential hypertension, NAJMA (obstructive sleep apnea), Other hyperlipidemia, Other specified hypothyroidism,  Seasonal allergic rhinitis, SVT (supraventricular tachycardia) ([facility]), and Type 2 diabetes mellitus without complication, without long-term current use of insulin ([facility]) (09/2022).  Surgical History:  He  has no past surgical history on file.   Family History:  His family history is not on file.  Social History:  He  reports that he has never smoked. He has never used smokeless tobacco. He reports that he does not drink alcohol and does not use drugs.    Tobacco:  He has never smoked tobacco.    CAGE Alcohol Screen:   CAGE screening score of 0 on 7/28/2025, showing low risk of alcohol abuse.      Patient Care Team:  Fausto Cervantes DO as PCP - General (Family Medicine)  Oswaldo Garvey MD (CARDIOLOGY)  Anne Huang MD (Cardiovascular Diseases)  Ricardo Virgen (UROLOGY)  Adeola Maldonado (OPHTHALMOLOGY)  Waqas Medeiros MD (Cardiovascular Diseases)    Review of Systems  GENERAL: feels well otherwise, see HPI  SKIN: denies any unusual skin lesions  EYES: denies blurred vision or double vision  HEENT: denies nasal congestion, sinus pain or ST  LUNGS: denies shortness of breath with exertion  CARDIOVASCULAR: denies chest pain on exertion  GI: denies abdominal pain, denies heartburn  : no complaint of urinary incontinence or leakage of urine  MUSCULOSKELETAL: denies back pain  NEURO: denies headaches  PSYCHE: denies depression or anxiety  HEMATOLOGIC: denies hx of anemia  ENDOCRINE: + thyroid history  ALL/ASTHMA: denies hx of allergy or asthma    Objective:   Physical Exam  General Appearance:  Alert, cooperative, no distress, appears stated age   Head:  Normocephalic, without obvious abnormality, atraumatic   Eyes:  PERRL, conjunctiva/corneas clear, EOM's intact, both eyes   Ears:  Normal TM's and external ear canals, both ears   Nose: Nares normal, septum midline, mucosa normal, no drainage or sinus tenderness   Throat: Lips, mucosa, and tongue normal; teeth and gums normal   Neck: Supple, symmetrical,  trachea midline, no adenopathy, thyroid: not enlarged, symmetric, no tenderness/mass/nodules, no carotid bruit or JVD   Back:   Symmetric, no curvature, ROM normal, no CVA tenderness   Lungs:   Clear to auscultation bilaterally, respirations unlabored   Chest Wall:  No tenderness or deformity   Heart:  Regular rate and rhythm, S1, S2 normal, no murmur, rub or gallop   Abdomen:   Soft, non-tender, bowel sounds active all four quadrants,  no masses, no organomegaly           Extremities: Extremities normal, atraumatic, no cyanosis or edema   Pulses: 2+ and symmetric   Skin: Skin color, texture, turgor normal, no rashes or lesions   Lymph nodes: Cervical, supraclavicular, and axillary nodes normal   Neurologic: Normal     /64   Pulse 61   Ht 5' 6\" (1.676 m)   Wt 163 lb (73.9 kg)   SpO2 98%   BMI 26.31 kg/m²  Estimated body mass index is 26.31 kg/m² as calculated from the following:    Height as of this encounter: 5' 6\" (1.676 m).    Weight as of this encounter: 163 lb (73.9 kg).    Medicare Hearing Assessment:   Hearing Screening    Screening Method: Finger Rub  Finger Rub Result: Pass               Assessment & Plan:   Pro Nur is a 85 year old male who presents for a Medicare Assessment.     1. Encounter for annual health examination (Primary)  2. Essential hypertension  3. Other hyperlipidemia  4. Coronary artery disease involving native coronary artery of native heart without angina pectoris  5. PSVT (paroxysmal supraventricular tachycardia) ([facility])  6. Type 2 diabetes mellitus without complication, without long-term current use of insulin ([facility])  7. Other specified hypothyroidism  8. Chronic left-sided low back pain with left-sided sciatica  9. Benign prostatic hyperplasia with nocturia  10. Vitamin D deficiency  Other orders  -     Prevnar 20 (PCV20) [[id number]]              -Reviewed recent lab panel.   -DM: Recent A1c 7.7%. Increase metformin to BID. RTC in 3 months for POC A1c.   -Hypothyroidism:  TSH recently normal. CPM.  -HTN/HLD/CAD/PSVT: Sees cardiology.   -BPH: Sees urology.   -Vit D defic: CPM with daily supplement.   -LBP: Much better s/p PT. Continue HEP.   -RTC in 3 months for repeat POC A1c.     The patient indicates understanding of these issues and agrees to the plan.  Reinforced healthy diet, lifestyle, and exercise.      No follow-ups on file.     Fausto Cervantes DO, 7/28/2025     Supplementary Documentation:   General Health:  In the past six months, have you lost more than 10 pounds without trying?: 1 - Yes  Has your appetite been poor?: No  Type of Diet: Other  How does the patient maintain a good energy level?: Daily Walks  How would you describe your daily physical activity?: Moderate  How would you describe your current health state?: Good  How do you maintain positive mental well-being?: Social Interaction  On a scale of 0 to 10, with 0 being no pain and 10 being severe pain, what is your pain level?: 1 - (Mild)  In the past six months, have you experienced urine leakage?: 1-Yes  At any time do you feel concerned for the safety/well-being of yourself and/or your children, in your home or elsewhere?: No  Have you had any immunizations at another office such as Influenza, Hepatitis B, Tetanus, or Pneumococcal?: Yes    Health Maintenance   Topic Date Due    Zoster Vaccines (1 of 2) Never done    Pneumococcal Vaccine: 50+ Years (2 of 2 - PCV) 10/08/2021    COVID-19 Vaccine (6 - 2024-25 season) 09/01/2024    Annual Depression Screening  01/01/2025    Annual Physical  07/22/2025    Influenza Vaccine (1) 10/01/2025    Diabetes Care Foot Exam  10/22/2025    Diabetes Care A1C  01/17/2026    Diabetes Care Dilated Eye Exam  05/06/2026    Diabetes Care: GFR  07/17/2026    Fall Risk Screening (Annual)  Completed    Diabetes Care: Microalb/Creat Ratio (Annual)  Completed    Meningococcal B Vaccine  Aged Out

## 2025-08-14 RX ORDER — LEVOTHYROXINE SODIUM 75 UG/1
75 TABLET ORAL
Qty: 90 TABLET | Refills: 3 | Status: SHIPPED | OUTPATIENT
Start: 2025-08-14